# Patient Record
Sex: MALE | Race: WHITE | Employment: OTHER | URBAN - METROPOLITAN AREA
[De-identification: names, ages, dates, MRNs, and addresses within clinical notes are randomized per-mention and may not be internally consistent; named-entity substitution may affect disease eponyms.]

---

## 2017-03-08 ENCOUNTER — HOSPITAL ENCOUNTER (OUTPATIENT)
Dept: ENDOSCOPY | Age: 62
Discharge: HOME | End: 2017-03-08
Attending: INTERNAL MEDICINE | Admitting: INTERNAL MEDICINE

## 2017-03-08 VITALS
WEIGHT: 216.71 LBS | HEIGHT: 70 IN | OXYGEN SATURATION: 94 % | BODY MASS INDEX: 31.03 KG/M2 | RESPIRATION RATE: 16 BRPM | SYSTOLIC BLOOD PRESSURE: 112 MMHG | TEMPERATURE: 97.4 F | DIASTOLIC BLOOD PRESSURE: 76 MMHG | HEART RATE: 65 BPM

## 2017-03-08 RX ORDER — MIDAZOLAM HYDROCHLORIDE 1 MG/ML
INJECTION INTRAMUSCULAR; INTRAVENOUS
Status: COMPLETED | OUTPATIENT
Start: 2017-03-08 | End: 2017-03-08

## 2017-03-08 RX ORDER — SODIUM CHLORIDE 450 MG/100ML
INJECTION, SOLUTION INTRAVENOUS CONTINUOUS
Status: DISCONTINUED | OUTPATIENT
Start: 2017-03-08 | End: 2017-03-09 | Stop reason: HOSPADM

## 2017-03-08 RX ADMIN — MIDAZOLAM HYDROCHLORIDE 3 MG: 1 INJECTION INTRAMUSCULAR; INTRAVENOUS at 10:15

## 2017-03-08 RX ADMIN — MIDAZOLAM HYDROCHLORIDE 1 MG: 1 INJECTION INTRAMUSCULAR; INTRAVENOUS at 10:24

## 2017-03-08 RX ADMIN — MIDAZOLAM HYDROCHLORIDE 1 MG: 1 INJECTION INTRAMUSCULAR; INTRAVENOUS at 10:18

## 2017-03-08 RX ADMIN — MIDAZOLAM HYDROCHLORIDE 1 MG: 1 INJECTION INTRAMUSCULAR; INTRAVENOUS at 10:20

## 2017-03-08 ASSESSMENT — PAIN - FUNCTIONAL ASSESSMENT: PAIN_FUNCTIONAL_ASSESSMENT: 0-10

## 2017-03-08 ASSESSMENT — PAIN SCALES - GENERAL
PAINLEVEL_OUTOF10: 0
PAINLEVEL_OUTOF10: 0

## 2017-04-05 ENCOUNTER — OFFICE VISIT (OUTPATIENT)
Dept: CARDIOLOGY CLINIC | Age: 62
End: 2017-04-05

## 2017-04-05 VITALS
HEART RATE: 72 BPM | BODY MASS INDEX: 31.5 KG/M2 | WEIGHT: 220 LBS | DIASTOLIC BLOOD PRESSURE: 70 MMHG | HEIGHT: 70 IN | OXYGEN SATURATION: 96 % | SYSTOLIC BLOOD PRESSURE: 110 MMHG

## 2017-04-05 DIAGNOSIS — I10 ESSENTIAL HYPERTENSION, BENIGN: ICD-10-CM

## 2017-04-05 DIAGNOSIS — I25.10 ATHEROSCLEROSIS OF NATIVE CORONARY ARTERY OF NATIVE HEART WITHOUT ANGINA PECTORIS: Primary | ICD-10-CM

## 2017-04-05 PROCEDURE — G8598 ASA/ANTIPLAT THER USED: HCPCS | Performed by: INTERNAL MEDICINE

## 2017-04-05 PROCEDURE — G8417 CALC BMI ABV UP PARAM F/U: HCPCS | Performed by: INTERNAL MEDICINE

## 2017-04-05 PROCEDURE — 1036F TOBACCO NON-USER: CPT | Performed by: INTERNAL MEDICINE

## 2017-04-05 PROCEDURE — G8427 DOCREV CUR MEDS BY ELIG CLIN: HCPCS | Performed by: INTERNAL MEDICINE

## 2017-04-05 PROCEDURE — 99214 OFFICE O/P EST MOD 30 MIN: CPT | Performed by: INTERNAL MEDICINE

## 2017-04-05 PROCEDURE — 3017F COLORECTAL CA SCREEN DOC REV: CPT | Performed by: INTERNAL MEDICINE

## 2017-04-05 RX ORDER — FLUTICASONE PROPIONATE 50 MCG
1 SPRAY, SUSPENSION (ML) NASAL PRN
COMMUNITY

## 2017-04-05 RX ORDER — AMLODIPINE BESYLATE 10 MG/1
TABLET ORAL
Qty: 90 TABLET | Refills: 3 | Status: SHIPPED | OUTPATIENT
Start: 2017-04-05 | End: 2018-03-23 | Stop reason: SDUPTHER

## 2017-04-05 RX ORDER — EZETIMIBE 10 MG/1
TABLET ORAL
Qty: 90 TABLET | Refills: 3 | Status: SHIPPED | OUTPATIENT
Start: 2017-04-05 | End: 2018-03-23 | Stop reason: SDUPTHER

## 2017-04-05 RX ORDER — LISINOPRIL 10 MG/1
TABLET ORAL
Qty: 90 TABLET | Refills: 3 | Status: SHIPPED | OUTPATIENT
Start: 2017-04-05 | End: 2017-06-28

## 2017-06-28 RX ORDER — LISINOPRIL 10 MG/1
TABLET ORAL
Qty: 90 TABLET | Refills: 3 | Status: SHIPPED | OUTPATIENT
Start: 2017-06-28 | End: 2018-05-02 | Stop reason: SDUPTHER

## 2017-07-19 ENCOUNTER — TELEPHONE (OUTPATIENT)
Dept: CARDIOLOGY CLINIC | Age: 62
End: 2017-07-19

## 2018-03-02 ENCOUNTER — TELEPHONE (OUTPATIENT)
Dept: CARDIOLOGY CLINIC | Age: 63
End: 2018-03-02

## 2018-03-02 DIAGNOSIS — I25.811 ATHEROSCLEROSIS OF NATIVE CORONARY ARTERY OF TRANSPLANTED HEART, ANGINA PRESENCE UNSPECIFIED: ICD-10-CM

## 2018-03-02 DIAGNOSIS — I10 ESSENTIAL HYPERTENSION, BENIGN: Primary | ICD-10-CM

## 2018-03-02 NOTE — TELEPHONE ENCOUNTER
Called patient to schedule 1 yr ov. 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 Please advise.

## 2018-03-23 RX ORDER — AMLODIPINE BESYLATE 10 MG/1
TABLET ORAL
Qty: 30 TABLET | Refills: 0 | Status: SHIPPED | OUTPATIENT
Start: 2018-03-23 | End: 2018-04-18 | Stop reason: SDUPTHER

## 2018-03-23 RX ORDER — EZETIMIBE 10 MG/1
TABLET ORAL
Qty: 30 TABLET | Refills: 0 | Status: SHIPPED | OUTPATIENT
Start: 2018-03-23 | End: 2018-04-18 | Stop reason: SDUPTHER

## 2018-04-04 ENCOUNTER — HOSPITAL ENCOUNTER (OUTPATIENT)
Dept: NON INVASIVE DIAGNOSTICS | Age: 63
Discharge: HOME | End: 2018-04-04
Attending: INTERNAL MEDICINE | Admitting: INTERNAL MEDICINE

## 2018-04-04 DIAGNOSIS — I10 ESSENTIAL (PRIMARY) HYPERTENSION: ICD-10-CM

## 2018-04-06 ENCOUNTER — TELEPHONE (OUTPATIENT)
Dept: CARDIOLOGY CLINIC | Age: 63
End: 2018-04-06

## 2018-04-10 ENCOUNTER — OFFICE VISIT (OUTPATIENT)
Dept: CARDIOLOGY CLINIC | Age: 63
End: 2018-04-10

## 2018-04-10 VITALS
BODY MASS INDEX: 33.64 KG/M2 | HEART RATE: 84 BPM | SYSTOLIC BLOOD PRESSURE: 112 MMHG | WEIGHT: 235 LBS | HEIGHT: 70 IN | DIASTOLIC BLOOD PRESSURE: 80 MMHG

## 2018-04-10 DIAGNOSIS — I10 ESSENTIAL HYPERTENSION, BENIGN: ICD-10-CM

## 2018-04-10 DIAGNOSIS — I25.811 ATHEROSCLEROSIS OF NATIVE CORONARY ARTERY OF TRANSPLANTED HEART, ANGINA PRESENCE UNSPECIFIED: Primary | ICD-10-CM

## 2018-04-10 PROCEDURE — G8417 CALC BMI ABV UP PARAM F/U: HCPCS | Performed by: INTERNAL MEDICINE

## 2018-04-10 PROCEDURE — 1036F TOBACCO NON-USER: CPT | Performed by: INTERNAL MEDICINE

## 2018-04-10 PROCEDURE — G8598 ASA/ANTIPLAT THER USED: HCPCS | Performed by: INTERNAL MEDICINE

## 2018-04-10 PROCEDURE — G8427 DOCREV CUR MEDS BY ELIG CLIN: HCPCS | Performed by: INTERNAL MEDICINE

## 2018-04-10 PROCEDURE — 99214 OFFICE O/P EST MOD 30 MIN: CPT | Performed by: INTERNAL MEDICINE

## 2018-04-10 PROCEDURE — 3017F COLORECTAL CA SCREEN DOC REV: CPT | Performed by: INTERNAL MEDICINE

## 2018-04-18 RX ORDER — EZETIMIBE 10 MG/1
TABLET ORAL
Qty: 30 TABLET | Refills: 11 | Status: SHIPPED | OUTPATIENT
Start: 2018-04-18 | End: 2019-02-06 | Stop reason: SDUPTHER

## 2018-04-18 RX ORDER — AMLODIPINE BESYLATE 10 MG/1
TABLET ORAL
Qty: 30 TABLET | Refills: 11 | Status: SHIPPED | OUTPATIENT
Start: 2018-04-18 | End: 2019-02-06 | Stop reason: SDUPTHER

## 2018-05-02 RX ORDER — LISINOPRIL 10 MG/1
TABLET ORAL
Qty: 90 TABLET | Refills: 3 | Status: SHIPPED | OUTPATIENT
Start: 2018-05-02 | End: 2019-04-18 | Stop reason: SDUPTHER

## 2018-11-17 ENCOUNTER — HOSPITAL ENCOUNTER (EMERGENCY)
Age: 63
Discharge: HOME | End: 2018-11-17
Attending: EMERGENCY MEDICINE
Payer: COMMERCIAL

## 2018-11-17 ENCOUNTER — APPOINTMENT (OUTPATIENT)
Dept: GENERAL RADIOLOGY | Age: 63
End: 2018-11-17
Payer: COMMERCIAL

## 2018-11-17 VITALS
DIASTOLIC BLOOD PRESSURE: 74 MMHG | TEMPERATURE: 99.1 F | OXYGEN SATURATION: 95 % | RESPIRATION RATE: 19 BRPM | HEART RATE: 90 BPM | SYSTOLIC BLOOD PRESSURE: 118 MMHG

## 2018-11-17 DIAGNOSIS — R05.9 COUGH: ICD-10-CM

## 2018-11-17 DIAGNOSIS — R69 ILLNESS: Primary | ICD-10-CM

## 2018-11-17 DIAGNOSIS — I49.3 PVC (PREMATURE VENTRICULAR CONTRACTION): ICD-10-CM

## 2018-11-17 LAB
A/G RATIO: 1.1 (ref 1.1–2.2)
ALBUMIN SERPL-MCNC: 3.6 G/DL (ref 3.4–5)
ALP BLD-CCNC: 73 U/L (ref 40–129)
ALT SERPL-CCNC: 31 U/L (ref 10–40)
ANION GAP SERPL CALCULATED.3IONS-SCNC: 13 MMOL/L (ref 3–16)
AST SERPL-CCNC: 25 U/L (ref 15–37)
BASOPHILS ABSOLUTE: 0 K/UL (ref 0–0.2)
BASOPHILS RELATIVE PERCENT: 0.4 %
BILIRUB SERPL-MCNC: 1.2 MG/DL (ref 0–1)
BUN BLDV-MCNC: 15 MG/DL (ref 7–20)
CALCIUM SERPL-MCNC: 8.9 MG/DL (ref 8.3–10.6)
CHLORIDE BLD-SCNC: 98 MMOL/L (ref 99–110)
CO2: 26 MMOL/L (ref 21–32)
CREAT SERPL-MCNC: 1 MG/DL (ref 0.8–1.3)
EOSINOPHILS ABSOLUTE: 0 K/UL (ref 0–0.6)
EOSINOPHILS RELATIVE PERCENT: 0.4 %
GFR AFRICAN AMERICAN: >60
GFR NON-AFRICAN AMERICAN: >60
GLOBULIN: 3.4 G/DL
GLUCOSE BLD-MCNC: 101 MG/DL (ref 70–99)
HCT VFR BLD CALC: 42.2 % (ref 40.5–52.5)
HEMOGLOBIN: 14.3 G/DL (ref 13.5–17.5)
LYMPHOCYTES ABSOLUTE: 1.1 K/UL (ref 1–5.1)
LYMPHOCYTES RELATIVE PERCENT: 10 %
MCH RBC QN AUTO: 30.8 PG (ref 26–34)
MCHC RBC AUTO-ENTMCNC: 34 G/DL (ref 31–36)
MCV RBC AUTO: 90.7 FL (ref 80–100)
MONOCYTES ABSOLUTE: 1.3 K/UL (ref 0–1.3)
MONOCYTES RELATIVE PERCENT: 12.7 %
NEUTROPHILS ABSOLUTE: 8.1 K/UL (ref 1.7–7.7)
NEUTROPHILS RELATIVE PERCENT: 76.5 %
PDW BLD-RTO: 14.4 % (ref 12.4–15.4)
PLATELET # BLD: 180 K/UL (ref 135–450)
PMV BLD AUTO: 8.4 FL (ref 5–10.5)
POTASSIUM REFLEX MAGNESIUM: 3.9 MMOL/L (ref 3.5–5.1)
RBC # BLD: 4.65 M/UL (ref 4.2–5.9)
SODIUM BLD-SCNC: 137 MMOL/L (ref 136–145)
TOTAL PROTEIN: 7 G/DL (ref 6.4–8.2)
TROPONIN: <0.01 NG/ML
WBC # BLD: 10.5 K/UL (ref 4–11)

## 2018-11-17 PROCEDURE — 71046 X-RAY EXAM CHEST 2 VIEWS: CPT

## 2018-11-17 PROCEDURE — 99285 EMERGENCY DEPT VISIT HI MDM: CPT

## 2018-11-17 PROCEDURE — 84484 ASSAY OF TROPONIN QUANT: CPT

## 2018-11-17 PROCEDURE — 85025 COMPLETE CBC W/AUTO DIFF WBC: CPT

## 2018-11-17 PROCEDURE — 93005 ELECTROCARDIOGRAM TRACING: CPT | Performed by: EMERGENCY MEDICINE

## 2018-11-17 PROCEDURE — 80053 COMPREHEN METABOLIC PANEL: CPT

## 2018-11-17 RX ORDER — ALBUTEROL SULFATE 90 UG/1
AEROSOL, METERED RESPIRATORY (INHALATION)
Qty: 1 INHALER | Refills: 1 | Status: SHIPPED | OUTPATIENT
Start: 2018-11-17 | End: 2021-10-11

## 2018-11-17 ASSESSMENT — ENCOUNTER SYMPTOMS
NAUSEA: 0
RESPIRATORY NEGATIVE: 1
VOMITING: 0
EYES NEGATIVE: 1
ABDOMINAL DISTENTION: 0
SHORTNESS OF BREATH: 0
WHEEZING: 0
CHEST TIGHTNESS: 0
GASTROINTESTINAL NEGATIVE: 1

## 2018-11-17 NOTE — ED PROVIDER NOTES
[facility]  eMERGENCY dEPARTMENT eNCOUnter        Pt Name: Lisette Hooper  MRN: [id number]  [place] 1955  Date of evaluation: 11/17/2018  Provider: INOCENTE Drake CNP  PCP: Lisbet Causey    This patient was seen and evaluated by the attending physician No att. providers found. CHIEF COMPLAINT       Chief Complaint   Patient presents with    Other     pt sent by [facility], pt was being treated for flu at [facility], was told he had an irregular heart beat and was told to come to ED. pt states SOB worsening since the summer. HISTORY OF PRESENT ILLNESS   (Location/Symptom, Timing/Onset, Context/Setting, Quality, Duration, Modifying Factors, Severity)  Note limiting factors. Lisette Hooper is a 57 y.o. male presents the ED today after being told to come here from the [facility]. Patient states that he was being seen at the local clinic for flulike symptoms, diagnosed with the flu however during his workup there the provider at the clinic stated he had a regular heartbeat was told to come to the ED. Patient has no history of irregular heartbeat prior. Patient does have a history of an MI with a stent placement in 2009. Patient also has a cardiologist Dr. All Yan who he last visited in May and has routine stress tests every other year and has not recently had an issue. Patient denies any shortness of breath no chest pain noted palpitations no vomiting or dizziness no lightheadedness. Patient does state he has chills and some nausea with a temp at home of 102. Patient is afebrile here at 99.1. Patient has been taking Tylenol at home with ibuprofen for flulike symptoms. Patient came here to the ED today for further evaluation and treatment of the \"irregular heartbeat. \"     Nursing Notes were all reviewed and agreed with or any disagreements were addressed  in the HPI.     REVIEW OF SYSTEMS    (2-9 systems for level 4, and no mass. There is no tenderness. There is no rebound and no guarding. No hernia. Musculoskeletal: Normal range of motion. He exhibits no edema, tenderness or deformity. Lymphadenopathy:     He has no cervical adenopathy. Neurological: He is alert and oriented to person, place, and time. He is not disoriented. GCS eye subscore is 4. GCS verbal subscore is 5. GCS motor subscore is 6. Skin: Skin is warm, dry and intact. Capillary refill takes 2 to 3 seconds. No rash noted. He is not diaphoretic. No erythema. No pallor. Psychiatric: He has a normal mood and affect. His speech is normal and behavior is normal. Judgment and thought content normal. Cognition and memory are normal.   Nursing note and vitals reviewed. DIAGNOSTIC RESULTS   LABS:    Labs Reviewed   CBC WITH AUTO DIFFERENTIAL - Abnormal; Notable for the following:        Result Value    Neutrophils # 8.1 (*)     All other components within normal limits    Narrative:     Performed at:  [address] [address]   Phone [phone]   COMPREHENSIVE METABOLIC PANEL W/ REFLEX TO MG FOR LOW K - Abnormal; Notable for the following:     Chloride 98 (*)     Glucose 101 (*)     Total Bilirubin 1.2 (*)     All other components within normal limits    Narrative:     Performed at:  [address] [address]   Phone [phone]   TROPONIN    Narrative:     Performed at:  [address] [address]   Phone [phone]       All other labs were within normal range or not returned as of this dictation. EKG: All EKG's are interpreted by the Emergency Department Physician who either signs orCo-signs this chart in the absence of a cardiologist.  Please see their note for interpretation of EKG.       RADIOLOGY:   Non-plain film images such as CT, Ultrasound and MRI are [id number]  [phone]    Schedule an appointment as soon as possible for a visit   As needed, If symptoms worsen      DISCHARGE MEDICATIONS:  New Prescriptions    ALBUTEROL SULFATE HFA (PROAIR HFA) 108 (90 BASE) MCG/ACT INHALER    Use every 4 hours while awake for 7-10 days then PRN wheezing  Dispense with SPACER and Instruct on use. May sub Ventolin or Proventil as needed per [employer].        DISCONTINUED MEDICATIONS:  Discontinued Medications    No medications on file              (Please note that portions ofthis note were completed with a voice recognition program.  Efforts were made to edit the dictations but occasionally words are mis-transcribed.)    INOCENTE Talamantes CNP (electronically signed)           INOCENTE Talamantes CNP  11/17/18 2884

## 2018-11-18 LAB
EKG ATRIAL RATE: 89 BPM
EKG DIAGNOSIS: NORMAL
EKG P AXIS: 69 DEGREES
EKG P-R INTERVAL: 170 MS
EKG Q-T INTERVAL: 356 MS
EKG QRS DURATION: 110 MS
EKG QTC CALCULATION (BAZETT): 433 MS
EKG R AXIS: -38 DEGREES
EKG T AXIS: 18 DEGREES
EKG VENTRICULAR RATE: 89 BPM

## 2018-11-18 PROCEDURE — 93010 ELECTROCARDIOGRAM REPORT: CPT | Performed by: INTERNAL MEDICINE

## 2019-02-06 RX ORDER — AMLODIPINE BESYLATE 10 MG/1
TABLET ORAL
Qty: 30 TABLET | Refills: 3 | Status: SHIPPED | OUTPATIENT
Start: 2019-02-06 | End: 2019-04-18 | Stop reason: SDUPTHER

## 2019-02-06 RX ORDER — EZETIMIBE 10 MG/1
TABLET ORAL
Qty: 30 TABLET | Refills: 3 | Status: SHIPPED | OUTPATIENT
Start: 2019-02-06 | End: 2019-04-18 | Stop reason: SDUPTHER

## 2019-04-11 ENCOUNTER — TELEPHONE (OUTPATIENT)
Dept: CARDIOLOGY CLINIC | Age: 64
End: 2019-04-11

## 2019-04-11 NOTE — TELEPHONE ENCOUNTER
Spoke with Janie Rangel and advised him of message from Dr. Kirk Ba. He states he will start the metoprolol as recommended.

## 2019-04-11 NOTE — TELEPHONE ENCOUNTER
Pt and wife on phone together. Pt was discharged today from [facility]. They prescribed metoprolol for him and they are reluctant to add a med w/o [facility] being aware. Pt scheduled with [facility] on 4/18. Please advise pt on if he should  med from pharmacy or what Walker Murcia would like to do. Pt would also like to make sure [facility] can see records from [address] [place] [address] where he was recently admitted.

## 2019-04-11 NOTE — TELEPHONE ENCOUNTER
Care Everywhere updated. Patient had elevated troponins at [facility] but declined a work up. Was started on metoprolol tartrate 25 mg BID.

## 2019-04-18 ENCOUNTER — OFFICE VISIT (OUTPATIENT)
Dept: CARDIOLOGY CLINIC | Age: 64
End: 2019-04-18
Payer: MEDICARE

## 2019-04-18 VITALS
SYSTOLIC BLOOD PRESSURE: 100 MMHG | OXYGEN SATURATION: 98 % | WEIGHT: 208.6 LBS | BODY MASS INDEX: 29.86 KG/M2 | HEART RATE: 98 BPM | DIASTOLIC BLOOD PRESSURE: 60 MMHG | HEIGHT: 70 IN

## 2019-04-18 DIAGNOSIS — I25.811 ATHEROSCLEROSIS OF NATIVE CORONARY ARTERY OF TRANSPLANTED HEART, ANGINA PRESENCE UNSPECIFIED: Primary | ICD-10-CM

## 2019-04-18 DIAGNOSIS — E78.2 MIXED HYPERLIPIDEMIA: ICD-10-CM

## 2019-04-18 DIAGNOSIS — I10 ESSENTIAL HYPERTENSION, BENIGN: ICD-10-CM

## 2019-04-18 PROCEDURE — 99214 OFFICE O/P EST MOD 30 MIN: CPT | Performed by: INTERNAL MEDICINE

## 2019-04-18 RX ORDER — LISINOPRIL 10 MG/1
TABLET ORAL
Qty: 90 TABLET | Refills: 3 | Status: SHIPPED | OUTPATIENT
Start: 2019-04-18 | End: 2020-01-03 | Stop reason: SDUPTHER

## 2019-04-18 RX ORDER — METRONIDAZOLE 500 MG/1
500 TABLET ORAL 3 TIMES DAILY
COMMUNITY
End: 2021-10-11

## 2019-04-18 RX ORDER — EZETIMIBE 10 MG/1
TABLET ORAL
Qty: 90 TABLET | Refills: 3 | Status: SHIPPED | OUTPATIENT
Start: 2019-04-18 | End: 2020-01-03 | Stop reason: SDUPTHER

## 2019-04-18 RX ORDER — AMLODIPINE BESYLATE 10 MG/1
TABLET ORAL
Qty: 90 TABLET | Refills: 3 | Status: SHIPPED | OUTPATIENT
Start: 2019-04-18 | End: 2020-01-03 | Stop reason: SDUPTHER

## 2019-04-18 NOTE — PROGRESS NOTES
[facility]   Cardiac Followup    Referring Provider:  Ale Daugherty     Chief Complaint   Patient presents with    1 Year Follow Up    Other     HR going up and down        History of Present Illness:   Mr Aron He is here for a routine follow up for CAD, hyperlipidemia and hypertension. Today he states he went to the hospital with fevers and chills. He was found to have liver mass and large kidney stone. This all started last November. He has fatigue since he las left the hospital. He has a PICC line in place for antibiotics. He is tolerating his medications. Patient currently denies any weight gain, edema, palpitations, chest pain, shortness of breath, dizziness, and syncope. Not checking BP at home. Past Medical History:   has a past medical history of CAD (coronary artery disease), Hyperlipidemia, Hypertension, and Torn  meniscus. Surgical History:   has a past surgical history that includes Coronary angioplasty with stent; Colonoscopy; and Leg Surgery. Social History:   reports that he has quit smoking. He has never used smokeless tobacco. He reports that he drinks alcohol. He reports that he does not use drugs. Family History:  family history includes Heart Disease in his father and mother. Home Medications:  Prior to Admission medications    Medication Sig Start Date End Date Taking? Authorizing Provider   lisinopril (PRINIVIL;ZESTRIL) 10 MG tablet Take 10 mg by mouth daily. Yes Historical Provider, MD   clopidogrel (PLAVIX) 75 MG tablet Take 75 mg by mouth daily. Yes Historical Provider, MD   ezetimibe (ZETIA) 10 MG tablet Take 10 mg by mouth daily. Yes Historical Provider, MD   aspirin 325 MG tablet Take 325 mg by mouth daily. Yes Historical Provider, MD   fluticasone (FLONASE) 50 MCG/ACT nasal spray 1 spray by Nasal route daily.      Yes Historical Provider, MD   nitroGLYCERIN (NITROSTAT) 0.4 MG SL tablet Place 0.4 mg under the tongue every 5 minutes as needed. Yes Historical Provider, MD   fexofenadine (ALLEGRA) 180 MG tablet Take 180 mg by mouth daily. Yes Historical Provider, MD   tamsulosin (FLOMAX) 0.4 MG capsule Take 0.4 mg by mouth daily. Yes Historical Provider, MD   amlodipine (NORVASC) 5 MG tablet Take 1 tablet by mouth 2 times daily. 4/20/11 4/19/12 Yes Valerio Gill MD   fish oil-omega-3 fatty acids 1000 MG capsule Take 2 g by mouth daily. Historical Provider, MD   Ascorbic Acid (VITAMIN C) 500 MG tablet Take 500 mg by mouth daily. Historical Provider, MD   multivitamin (ANTIOXIDANT;PROSIGHT) TABS per tablet Take 1 tablet by mouth daily. Historical Provider, MD   etodolac (LODINE XL) 400 MG SR tablet Take 400 mg by mouth daily. Indications: 1-2 tabs as needed    Historical Provider, MD   atenolol (TENORMIN) 25 MG tablet Take 1 tablet by mouth daily. 4/22/11 4/21/12  Valerio Gill MD        Allergies:  Simvastatin and Statins [statins]     Review of Systems:   · Constitutional: there has been no unanticipated weight loss. There's been no change in energy level, sleep pattern, or activity level. · Eyes: No visual changes or diplopia. No scleral icterus. · ENT: No Headaches, hearing loss or vertigo. No mouth sores or sore throat. · Cardiovascular: Reviewed in HPI  · Respiratory: No cough or wheezing, no sputum production. No hematemesis. · Gastrointestinal: No abdominal pain, appetite loss, blood in stools. No change in bowel or bladder habits. · Genitourinary: No dysuria, trouble voiding, or hematuria. · Musculoskeletal:  No gait disturbance, weakness or joint complaints. · Integumentary: No rash or pruritis. · Neurological: No headache, diplopia, change in muscle strength, numbness or tingling. No change in gait, balance, coordination, mood, affect, memory, mentation, behavior. · Psychiatric: No anxiety, no depression. · Endocrine: No malaise, fatigue or temperature intolerance.  No excessive thirst, fluid intake, or urination. No tremor. · Hematologic/Lymphatic: No abnormal bruising or bleeding, blood clots or swollen lymph nodes. · Allergic/Immunologic: No nasal congestion or hives. Physical Examination:    Vitals:    04/18/19 1508   BP: 100/60   Pulse: 98   SpO2: 98%        Constitutional and General Appearance: NAD   Respiratory:  · Normal excursion and expansion without use of accessory muscles  · Resp Auscultation: Normal breath sounds without dullness  Cardiovascular:  · The apical impulses not displaced  · Heart tones are crisp and normal  · Cervical veins are not engorged  · The carotid upstroke is normal in amplitude and contour without delay or bruit  · Normal S1S2, No S3, No Murmur  · Peripheral pulses are symmetrical and full  · There is no clubbing, cyanosis of the extremities. · No edema  · Femoral Arteries: 2+ and equal  · Pedal Pulses: 2+ and equal   Abdomen:  · No masses or tenderness  · Liver/Spleen: No Abnormalities Noted  Neurological/Psychiatric:  · Alert and oriented in all spheres  · Moves all extremities well  · Exhibits normal gait balance and coordination  · No abnormalities of mood, affect, memory, mentation, or behavior are noted    Stress echo 4/16/2014  Normal  Plain GXT 4/2016  Normal    GXT 4/2018   Normal (Negative) response to exercise.  GXT Negative for ischemia   Frequent PVC's at rest. Frequency of PVC's decreased during exercise.   Rare couplet noted.   Exercise induced shortness of breath 4/10. Dyspnea resolved in recovery.   Patient denied chest pain. Assessment:     1. CAD (coronary artery disease) -- IWMI 2009 - thrombectomy, no stent. No angina. Stable. 2. HTN -- well controlled. Does not ck at home. 3. Hyperlipidemia --intolerant to statins, lipids have been acceptable. Managed by PCP. Stable. 4. Kidney   5.  Liver mass     Plan:  Continue current medications   Check blood pressure at home weekly  Regular exercise and following a healthy diet encouraged   Follow up with me in 1 year     Andrewibyojana's attestation: This note was scribed in the presence of Dr. Nico Sanchez M.D. By Kobe Abreu RN    The andrewibes docuementation has been prepared under my direction and personally reviewed by me in its entirety. I confirm that the note above accurately reflects all work, treatment, procedures, and medical decision making performed by me. MD Alex Joyner.  Carla Salcedo M.D., [facility]

## 2019-04-18 NOTE — PATIENT INSTRUCTIONS
Plan:  Continue current medications   Check blood pressure at home weekly  Regular exercise and following a healthy diet encouraged   Follow up with me in 1 year

## 2019-04-22 ENCOUNTER — TELEPHONE (OUTPATIENT)
Dept: CARDIOLOGY CLINIC | Age: 64
End: 2019-04-22

## 2019-04-22 NOTE — TELEPHONE ENCOUNTER
Usha Barfield at [facility] requesting letter of cardiac clearance for kidney stone removal with general anesthesia scheduled for tomorrow, 4/23. Please fax to [phone].  Katlyn Lowe

## 2019-05-30 ENCOUNTER — TELEPHONE (OUTPATIENT)
Dept: CARDIOLOGY CLINIC | Age: 64
End: 2019-05-30

## 2019-05-30 DIAGNOSIS — I25.811 ATHEROSCLEROSIS OF NATIVE CORONARY ARTERY OF TRANSPLANTED HEART, ANGINA PRESENCE UNSPECIFIED: ICD-10-CM

## 2019-05-30 DIAGNOSIS — Z02.89 ENCOUNTER FOR EXAMINATION REQUIRED BY DEPARTMENT OF TRANSPORTATION (DOT): Primary | ICD-10-CM

## 2019-06-14 ENCOUNTER — HOSPITAL ENCOUNTER (OUTPATIENT)
Dept: CARDIOLOGY | Age: 64
Discharge: HOME | End: 2019-06-14
Payer: MEDICARE

## 2019-06-14 VITALS — WEIGHT: 208 LBS | BODY MASS INDEX: 29.78 KG/M2 | HEIGHT: 70 IN

## 2019-06-14 DIAGNOSIS — Z02.89 ENCOUNTER FOR EXAMINATION REQUIRED BY DEPARTMENT OF TRANSPORTATION (DOT): ICD-10-CM

## 2019-06-14 DIAGNOSIS — I25.811 ATHEROSCLEROSIS OF NATIVE CORONARY ARTERY OF TRANSPLANTED HEART, ANGINA PRESENCE UNSPECIFIED: ICD-10-CM

## 2019-06-14 LAB
LV EF: 48 %
LVEF MODALITY: NORMAL

## 2019-06-14 PROCEDURE — 3430000000 HC RX DIAGNOSTIC RADIOPHARMACEUTICAL: Performed by: INTERNAL MEDICINE

## 2019-06-14 PROCEDURE — 93017 CV STRESS TEST TRACING ONLY: CPT

## 2019-06-14 PROCEDURE — A9502 TC99M TETROFOSMIN: HCPCS | Performed by: INTERNAL MEDICINE

## 2019-06-14 PROCEDURE — 78452 HT MUSCLE IMAGE SPECT MULT: CPT

## 2019-06-14 RX ADMIN — TETROFOSMIN 33.8 MILLICURIE: 1.38 INJECTION, POWDER, LYOPHILIZED, FOR SOLUTION INTRAVENOUS at 09:45

## 2019-06-14 RX ADMIN — TETROFOSMIN 11.6 MILLICURIE: 1.38 INJECTION, POWDER, LYOPHILIZED, FOR SOLUTION INTRAVENOUS at 08:15

## 2019-06-17 ENCOUNTER — TELEPHONE (OUTPATIENT)
Dept: CARDIOLOGY CLINIC | Age: 64
End: 2019-06-17

## 2019-06-17 NOTE — TELEPHONE ENCOUNTER
----- Message from Abbie Quinonez MD sent at 6/14/2019 12:55 PM EDT -----  Call  Stress test ok  Shows old MI  No new blockage  Low risk

## 2019-06-17 NOTE — TELEPHONE ENCOUNTER
Created telephone encounter. Spoke with Cristiane Pinedo relayed message per [address] regarding Stress test. Pt verbalized understanding. Pt would like report faxed to [facility] [phone], faxed as requested.

## 2019-06-18 ENCOUNTER — TELEPHONE (OUTPATIENT)
Dept: CARDIOLOGY CLINIC | Age: 64
End: 2019-06-18

## 2019-12-10 ENCOUNTER — OFFICE VISIT (OUTPATIENT)
Dept: ORTHOPEDIC SURGERY | Age: 64
End: 2019-12-10
Payer: MEDICARE

## 2019-12-10 VITALS
HEART RATE: 50 BPM | BODY MASS INDEX: 32.93 KG/M2 | HEIGHT: 70 IN | SYSTOLIC BLOOD PRESSURE: 111 MMHG | WEIGHT: 230 LBS | DIASTOLIC BLOOD PRESSURE: 72 MMHG

## 2019-12-10 DIAGNOSIS — M17.0 PRIMARY OSTEOARTHRITIS OF BOTH KNEES: Primary | ICD-10-CM

## 2019-12-10 PROCEDURE — 99204 OFFICE O/P NEW MOD 45 MIN: CPT | Performed by: PHYSICIAN ASSISTANT

## 2019-12-11 ENCOUNTER — TELEPHONE (OUTPATIENT)
Dept: CARDIOLOGY CLINIC | Age: 64
End: 2019-12-11

## 2020-01-03 RX ORDER — EZETIMIBE 10 MG/1
TABLET ORAL
Qty: 90 TABLET | Refills: 3 | Status: SHIPPED | OUTPATIENT
Start: 2020-01-03 | End: 2020-10-16

## 2020-01-03 RX ORDER — LISINOPRIL 10 MG/1
TABLET ORAL
Qty: 90 TABLET | Refills: 3 | Status: SHIPPED | OUTPATIENT
Start: 2020-01-03 | End: 2020-10-16

## 2020-01-03 RX ORDER — AMLODIPINE BESYLATE 10 MG/1
TABLET ORAL
Qty: 90 TABLET | Refills: 3 | Status: SHIPPED | OUTPATIENT
Start: 2020-01-03 | End: 2020-10-16

## 2020-10-19 RX ORDER — LISINOPRIL 10 MG/1
TABLET ORAL
Qty: 90 TABLET | Refills: 0 | Status: SHIPPED | OUTPATIENT
Start: 2020-10-19 | End: 2020-10-21 | Stop reason: SDUPTHER

## 2020-10-19 RX ORDER — AMLODIPINE BESYLATE 10 MG/1
TABLET ORAL
Qty: 90 TABLET | Refills: 0 | Status: SHIPPED | OUTPATIENT
Start: 2020-10-19 | End: 2020-10-21 | Stop reason: SDUPTHER

## 2020-10-19 RX ORDER — EZETIMIBE 10 MG/1
TABLET ORAL
Qty: 90 TABLET | Refills: 0 | Status: SHIPPED | OUTPATIENT
Start: 2020-10-19 | End: 2020-10-21 | Stop reason: SDUPTHER

## 2020-10-21 ENCOUNTER — HOSPITAL ENCOUNTER (OUTPATIENT)
Age: 65
Discharge: HOME | End: 2020-10-21
Payer: MEDICARE

## 2020-10-21 ENCOUNTER — OFFICE VISIT (OUTPATIENT)
Dept: CARDIOLOGY CLINIC | Age: 65
End: 2020-10-21
Payer: MEDICARE

## 2020-10-21 VITALS
WEIGHT: 229.5 LBS | OXYGEN SATURATION: 95 % | HEIGHT: 70 IN | BODY MASS INDEX: 32.86 KG/M2 | DIASTOLIC BLOOD PRESSURE: 80 MMHG | SYSTOLIC BLOOD PRESSURE: 100 MMHG | HEART RATE: 70 BPM

## 2020-10-21 PROCEDURE — 80061 LIPID PANEL: CPT

## 2020-10-21 PROCEDURE — 99214 OFFICE O/P EST MOD 30 MIN: CPT | Performed by: INTERNAL MEDICINE

## 2020-10-21 PROCEDURE — 36415 COLL VENOUS BLD VENIPUNCTURE: CPT

## 2020-10-21 RX ORDER — NITROGLYCERIN 0.4 MG/1
0.4 TABLET SUBLINGUAL EVERY 5 MIN PRN
Qty: 25 TABLET | Refills: 0 | Status: SHIPPED | OUTPATIENT
Start: 2020-10-21 | End: 2021-10-11 | Stop reason: SDUPTHER

## 2020-10-21 RX ORDER — LISINOPRIL 10 MG/1
TABLET ORAL
Qty: 90 TABLET | Refills: 3 | Status: SHIPPED | OUTPATIENT
Start: 2020-10-21 | End: 2021-07-20

## 2020-10-21 RX ORDER — EZETIMIBE 10 MG/1
TABLET ORAL
Qty: 90 TABLET | Refills: 3 | Status: SHIPPED | OUTPATIENT
Start: 2020-10-21 | End: 2021-07-20

## 2020-10-21 RX ORDER — AMLODIPINE BESYLATE 10 MG/1
TABLET ORAL
Qty: 90 TABLET | Refills: 3 | Status: SHIPPED | OUTPATIENT
Start: 2020-10-21 | End: 2021-07-20

## 2020-10-21 NOTE — PROGRESS NOTES
mid inferolateral  wall. There is a large defect in the inferolateral and inferior wall at  stress that does not improve with rest consistent with myocardial scar. No  gross ischemia. Assessment:     1. CAD (coronary artery disease) -- IWMI 2009 - thrombectomy, no stent. No angina. Stable. 2. HTN -- low today. No symptoms. Cks at home, well controlled. 3. Hyperlipidemia --intolerant to statins, lipids have been acceptable. Results reviewed. LDL < 100. Doni Confer No Repatha at this time. Stable. 4. History of kidney stones   5. Liver mass     Plan:  Fasting lipids   Continue current medications   Check blood pressure at home weekly  Regular exercise and following a healthy diet encouraged   Follow up with me in 1 year     Scribe's attestation: This note was scribed in the presence of Dr. Sybil Chow M.D. By Gm Cooley RN    The scribes documentation has been prepared under my direction and personally reviewed by me in its entirety. I confirm that the note above accurately reflects all work, treatment, procedures, and medical decision making performed by me. MD Gopi Mcguire.  Mattie Nye M.D., [facility]

## 2020-10-21 NOTE — PATIENT INSTRUCTIONS
Plan:  Fasting lipids   Continue current medications   Check blood pressure at home weekly  Regular exercise and following a healthy diet encouraged   Follow up with me in 1 year

## 2020-10-21 NOTE — LETTER
[facility]   Cardiac Followup    Referring Provider:  Yaya Majano     Chief Complaint   Patient presents with    1 Year Follow Up     No new cardiac symptoms or concerns to report at this time.  Coronary Artery Disease    Hypertension    Hyperlipidemia        History of Present Illness:    Bishop Bishop is a 75 y.o. male who is here today for follow up for a history of coronary artery disease- IWMI in 2009 with thrombectomy but no stent placement, hypertension, hyperlipidemia. He had a stress test in 6/2019 which was abnormal but did not show any gross ischemia or new blockages. Today he states he has been feeling well. His blood pressure at home is well controlled. He is tolerating his medications. He states he had been going to the gym prior to [facility]. He is normally active all summer. Patient currently denies any weight gain, edema, palpitations, chest pain, shortness of breath, dizziness, and syncope. Past Medical History:   has a past medical history of CAD (coronary artery disease), Hyperlipidemia, Hypertension, and Torn  meniscus. Surgical History:   has a past surgical history that includes Coronary angioplasty with stent; Colonoscopy; and Leg Surgery. Social History:   reports that he has quit smoking. He has never used smokeless tobacco. He reports current alcohol use. He reports that he does not use drugs. Family History:  family history includes Heart Disease in his father and mother. Home Medications:  Prior to Admission medications    Medication Sig Start Date End Date Taking? Authorizing Provider   lisinopril (PRINIVIL;ZESTRIL) 10 MG tablet Take 10 mg by mouth daily. Yes Historical Provider, MD   clopidogrel (PLAVIX) 75 MG tablet Take 75 mg by mouth daily. Yes Historical Provider, MD   ezetimibe (ZETIA) 10 MG tablet Take 10 mg by mouth daily. Yes Historical Provider, MD   aspirin 325 MG tablet Take 325 mg by mouth daily.      Yes Historical

## 2020-10-22 ENCOUNTER — TELEPHONE (OUTPATIENT)
Dept: CARDIOLOGY CLINIC | Age: 65
End: 2020-10-22

## 2020-10-22 LAB
CHOLESTEROL, TOTAL: 177 MG/DL (ref 0–199)
HDLC SERPL-MCNC: 51 MG/DL (ref 40–60)
LDL CHOLESTEROL CALCULATED: 108 MG/DL
TRIGL SERPL-MCNC: 88 MG/DL (ref 0–150)
VLDLC SERPL CALC-MCNC: 18 MG/DL

## 2020-10-22 NOTE — TELEPHONE ENCOUNTER
----- Message from Schuyler Mccann MD sent at 10/22/2020  8:51 AM EDT -----  Call  Chol remains elev despite zetia  Intolerant to statin  Discuss and start PA for Repfamilia

## 2020-10-22 NOTE — TELEPHONE ENCOUNTER
I called and spoke with patient reviewing results. Discussed Repatha. Patient would like to think about it and will call us back.

## 2020-10-30 ENCOUNTER — TELEPHONE (OUTPATIENT)
Dept: CARDIOLOGY CLINIC | Age: 65
End: 2020-10-30

## 2020-10-30 NOTE — TELEPHONE ENCOUNTER
Pt calling wanting to speak with [facility]. Regarding Repatha. Please call.

## 2021-07-20 RX ORDER — AMLODIPINE BESYLATE 10 MG/1
TABLET ORAL
Qty: 90 TABLET | Refills: 1 | Status: SHIPPED | OUTPATIENT
Start: 2021-07-20 | End: 2022-05-23

## 2021-07-20 RX ORDER — EZETIMIBE 10 MG/1
TABLET ORAL
Qty: 90 TABLET | Refills: 1 | Status: SHIPPED | OUTPATIENT
Start: 2021-07-20 | End: 2021-10-11 | Stop reason: ALTCHOICE

## 2021-07-20 RX ORDER — LISINOPRIL 10 MG/1
TABLET ORAL
Qty: 90 TABLET | Refills: 1 | Status: SHIPPED | OUTPATIENT
Start: 2021-07-20 | End: 2022-05-23

## 2021-10-06 ENCOUNTER — TELEPHONE (OUTPATIENT)
Dept: CARDIOLOGY CLINIC | Age: 66
End: 2021-10-06

## 2021-10-07 NOTE — TELEPHONE ENCOUNTER
[facility] called to check on refill request for Repatha. I advised [facility] already requested a refill and a PA. [facility] advised they have been taking care of pt's Reptha for months and they are the pt's preferred pharmacy. Please send refill to [facility] @ [phone].

## 2021-10-11 ENCOUNTER — OFFICE VISIT (OUTPATIENT)
Dept: CARDIOLOGY CLINIC | Age: 66
End: 2021-10-11
Payer: MEDICARE

## 2021-10-11 VITALS
HEART RATE: 81 BPM | DIASTOLIC BLOOD PRESSURE: 76 MMHG | WEIGHT: 231 LBS | HEIGHT: 70 IN | BODY MASS INDEX: 33.07 KG/M2 | OXYGEN SATURATION: 96 % | SYSTOLIC BLOOD PRESSURE: 130 MMHG

## 2021-10-11 DIAGNOSIS — I25.10 CORONARY ARTERY DISEASE INVOLVING NATIVE CORONARY ARTERY OF NATIVE HEART WITHOUT ANGINA PECTORIS: Primary | ICD-10-CM

## 2021-10-11 PROCEDURE — G8417 CALC BMI ABV UP PARAM F/U: HCPCS | Performed by: INTERNAL MEDICINE

## 2021-10-11 PROCEDURE — 3017F COLORECTAL CA SCREEN DOC REV: CPT | Performed by: INTERNAL MEDICINE

## 2021-10-11 PROCEDURE — 4040F PNEUMOC VAC/ADMIN/RCVD: CPT | Performed by: INTERNAL MEDICINE

## 2021-10-11 PROCEDURE — 1036F TOBACCO NON-USER: CPT | Performed by: INTERNAL MEDICINE

## 2021-10-11 PROCEDURE — G8427 DOCREV CUR MEDS BY ELIG CLIN: HCPCS | Performed by: INTERNAL MEDICINE

## 2021-10-11 PROCEDURE — 99214 OFFICE O/P EST MOD 30 MIN: CPT | Performed by: INTERNAL MEDICINE

## 2021-10-11 PROCEDURE — 1123F ACP DISCUSS/DSCN MKR DOCD: CPT | Performed by: INTERNAL MEDICINE

## 2021-10-11 PROCEDURE — G8484 FLU IMMUNIZE NO ADMIN: HCPCS | Performed by: INTERNAL MEDICINE

## 2021-10-11 RX ORDER — EVOLOCUMAB 140 MG/ML
INJECTION, SOLUTION SUBCUTANEOUS
Qty: 2 ML | Refills: 11 | Status: SHIPPED | OUTPATIENT
Start: 2021-10-11 | End: 2022-08-11

## 2021-10-11 RX ORDER — NITROGLYCERIN 0.4 MG/1
0.4 TABLET SUBLINGUAL EVERY 5 MIN PRN
Qty: 25 TABLET | Refills: 1 | Status: SHIPPED | OUTPATIENT
Start: 2021-10-11 | End: 2021-11-03

## 2021-10-11 NOTE — PROGRESS NOTES
[facility] [facility]   Cardiac Followup    Referring Provider:  Armando Miller     Chief Complaint   Patient presents with    1 Year Follow Up    Hypertension    Hyperlipidemia      History of Present Illness:    Valdo Martell is a 77 y.o. male who is here today for follow up for a history of coronary artery disease- IWMI in 2009 with thrombectomy but no stent placement, hypertension, hyperlipidemia. He had a stress test in 6/2019 which was abnormal but did not show any gross ischemia or new blockages. Today, he states that he feels well. He has no complaints. He denies exertional chest pain, MUHAMMAD/PND, palpitations, light-headedness, edema. He remains on Repatha without side effects. Remains active. BP normal at home. .    Past Medical History:   has a past medical history of CAD (coronary artery disease), Hyperlipidemia, Hypertension, and Torn  meniscus. Surgical History:   has a past surgical history that includes Coronary angioplasty with stent; Colonoscopy; and Leg Surgery. Social History:   reports that he has quit smoking. He has never used smokeless tobacco. He reports current alcohol use. He reports that he does not use drugs. Family History:  family history includes Heart Disease in his father and mother. Home Medications:  Prior to Admission medications    Medication Sig Start Date End Date Taking? Authorizing Provider   lisinopril (PRINIVIL;ZESTRIL) 10 MG tablet Take 10 mg by mouth daily. Yes Historical Provider, MD   clopidogrel (PLAVIX) 75 MG tablet Take 75 mg by mouth daily. Yes Historical Provider, MD   ezetimibe (ZETIA) 10 MG tablet Take 10 mg by mouth daily. Yes Historical Provider, MD   aspirin 325 MG tablet Take 325 mg by mouth daily. Yes Historical Provider, MD   fluticasone (FLONASE) 50 MCG/ACT nasal spray 1 spray by Nasal route daily.      Yes Historical Provider, MD   nitroGLYCERIN (NITROSTAT) 0.4 MG SL tablet Place 0.4 mg under the tongue every 5 minutes as needed. Yes Historical Provider, MD   fexofenadine (ALLEGRA) 180 MG tablet Take 180 mg by mouth daily. Yes Historical Provider, MD   tamsulosin (FLOMAX) 0.4 MG capsule Take 0.4 mg by mouth daily. Yes Historical Provider, MD   amlodipine (NORVASC) 5 MG tablet Take 1 tablet by mouth 2 times daily. 4/20/11 4/19/12 Yes Marsha Lima MD   fish oil-omega-3 fatty acids 1000 MG capsule Take 2 g by mouth daily. Historical Provider, MD   Ascorbic Acid (VITAMIN C) 500 MG tablet Take 500 mg by mouth daily. Historical Provider, MD   multivitamin (ANTIOXIDANT;PROSIGHT) TABS per tablet Take 1 tablet by mouth daily. Historical Provider, MD   etodolac (LODINE XL) 400 MG SR tablet Take 400 mg by mouth daily. Indications: 1-2 tabs as needed    Historical Provider, MD   atenolol (TENORMIN) 25 MG tablet Take 1 tablet by mouth daily. 4/22/11 4/21/12  Marsha Lima MD        Allergies:  Simvastatin and Statins [statins]     Review of Systems:   · Constitutional: there has been no unanticipated weight loss. There's been no change in energy level, sleep pattern, or activity level. · Eyes: No visual changes or diplopia. No scleral icterus. · ENT: No Headaches, hearing loss or vertigo. No mouth sores or sore throat. · Cardiovascular: Reviewed in HPI  · Respiratory: No cough or wheezing, no sputum production. No hematemesis. · Gastrointestinal: No abdominal pain, appetite loss, blood in stools. No change in bowel or bladder habits. · Genitourinary: No dysuria, trouble voiding, or hematuria. · Musculoskeletal:  No gait disturbance, weakness or joint complaints. · Integumentary: No rash or pruritis. · Neurological: No headache, diplopia, change in muscle strength, numbness or tingling. No change in gait, balance, coordination, mood, affect, memory, mentation, behavior. · Psychiatric: No anxiety, no depression. · Endocrine: No malaise, fatigue or temperature intolerance.  No excessive thirst, fluid intake, or urination. No tremor. · Hematologic/Lymphatic: No abnormal bruising or bleeding, blood clots or swollen lymph nodes. · Allergic/Immunologic: No nasal congestion or hives. Physical Examination:    Vitals:    10/11/21 1506   BP: 130/76   Pulse: 81   SpO2: 96%        Constitutional and General Appearance: NAD   Respiratory:  · Normal excursion and expansion without use of accessory muscles  · Resp Auscultation: Normal breath sounds without dullness  Cardiovascular:  · The apical impulses not displaced  · Heart tones are crisp and normal  · Cervical veins are not engorged  · The carotid upstroke is normal in amplitude and contour without delay or bruit  · Normal S1S2, No S3, No Murmur  · Peripheral pulses are symmetrical and full  · There is no clubbing, cyanosis of the extremities. · No edema  · Femoral Arteries: 2+ and equal  · Pedal Pulses: 2+ and equal   Abdomen:  · No masses or tenderness  · Liver/Spleen: No Abnormalities Noted  Neurological/Psychiatric:  · Alert and oriented in all spheres  · Moves all extremities well  · Exhibits normal gait balance and coordination  · No abnormalities of mood, affect, memory, mentation, or behavior are noted    Stress echo 4/16/2014  Normal  Plain GXT 4/2016  Normal    GXT 4/2018   Normal (Negative) response to exercise.  GXT Negative for ischemia   Frequent PVC's at rest. Frequency of PVC's decreased during exercise.   Rare couplet noted.   Exercise induced shortness of breath 4/10. Dyspnea resolved in recovery.   Patient denied chest pain. Stress test 6/14/19  Summary  ABNORMAL MODERATE RISK STRESS TEST>  Normal LV size and mildly reduced systolic function. Left ventricular  ejection fraction of 48%. Hypokinesis of the basal and mid inferolateral  wall. There is a large defect in the inferolateral and inferior wall at  stress that does not improve with rest consistent with myocardial scar. No  gross ischemia. Assessment:   1. CAD (coronary artery disease) -- IWMI 2009 - thrombectomy, no stent. No angina. Stable. 2. HTN -- Stable, Cks at home, well controlled. 3. Hyperlipidemia -- Intolerant to statins, lipids excellent on Repatha. OK to stop Zetia. 4. History of kidney stones   5. Liver mass     Plan:  Stop Zetia  Will send PA for Repatha. [facility] Cardiac medications reviewed including indications and pertinent side effects    Check blood pressure and heart rate at home a few times per week- keep a log with dates and times and bring to office visit   Regular exercise and following a healthy diet encouraged   Follow up with me in 1 year    Zamzam Weber. Marian Alvarez M.D., [facility], [address] attestation: This note was scribed in the presence of Dr. Marian Alvarez by Hailey Lai RN. The scribes documentation has been prepared under my direction and personally reviewed by me in its entirety. I confirm that the note above accurately reflects all work, treatment, procedures, and medical decision making performed by me.     Dr. Saravanan Tracy MD

## 2021-11-03 RX ORDER — NITROGLYCERIN 0.4 MG/1
TABLET SUBLINGUAL
Qty: 25 TABLET | Refills: 1 | Status: SHIPPED | OUTPATIENT
Start: 2021-11-03

## 2022-01-20 ENCOUNTER — TELEPHONE (OUTPATIENT)
Dept: CARDIOLOGY CLINIC | Age: 67
End: 2022-01-20

## 2022-01-20 NOTE — TELEPHONE ENCOUNTER
CARDIAC CLEARANCE REQUEST    What type of procedure are you having:  Right knee replacement  Are you taking any blood thinners:  aspirin 81 MG tablet [[id number]]      Type on anesthesia:  Not sure  When is your procedure scheduled for:  2/15/22  What physician is performing your procedure:  Dr. Franklyn Keith  Phone Number:    Fax number to send the letter:  [phone]    Pt had an EKG on 01/19/2022.

## 2022-01-20 NOTE — LETTER
[address] Cardiology - [address] [place] [address] [facility]  Phone: [phone]  Fax: [phone]    Blu Chaudhari MD      Cardiac Clearance Letter  January 21, 2022    Shamar [phone] [phone]   1955        Abelino Moore may proceed with upcoming knee replacement. He is cleared from a cardiac standpoint. He is to remain on ASA, do not stop ASA. If you have any questions or concerns, please don't hesitate to call.     Sincerely,        Blu Chaudhari MD

## 2022-05-23 RX ORDER — LISINOPRIL 10 MG/1
TABLET ORAL
Qty: 90 TABLET | Refills: 2 | Status: SHIPPED | OUTPATIENT
Start: 2022-05-23

## 2022-05-23 RX ORDER — AMLODIPINE BESYLATE 10 MG/1
TABLET ORAL
Qty: 90 TABLET | Refills: 2 | Status: SHIPPED | OUTPATIENT
Start: 2022-05-23

## 2022-08-09 RX ORDER — EVOLOCUMAB 140 MG/ML
INJECTION, SOLUTION SUBCUTANEOUS
Qty: 6 ML | Status: CANCELLED | OUTPATIENT
Start: 2022-08-09

## 2022-08-11 RX ORDER — EVOLOCUMAB 140 MG/ML
INJECTION, SOLUTION SUBCUTANEOUS
Qty: 6 ML | Refills: 3 | Status: SHIPPED | OUTPATIENT
Start: 2022-08-11

## 2022-09-27 ENCOUNTER — TELEPHONE (OUTPATIENT)
Dept: CARDIOLOGY CLINIC | Age: 67
End: 2022-09-27

## 2022-09-27 NOTE — TELEPHONE ENCOUNTER
CARDIAC CLEARANCE REQUEST    What type of procedure are you having: left knee replacement     Are you taking any blood thinners: ASA      Type on anesthesia: unsure      When is your procedure scheduled for: 11/01/2022    What physician is performing your procedure: Dr. Jose Mann     Phone Number: [phone]    Fax number to send the letter:

## 2022-09-27 NOTE — TELEPHONE ENCOUNTER
LMOM for pt to contact the office to schedule an appt. I went ahead and scheduled for [facility] on 9/28. Please confirm that this works for the pt when he calls back.

## 2022-10-21 ENCOUNTER — OFFICE VISIT (OUTPATIENT)
Dept: CARDIOLOGY CLINIC | Age: 67
End: 2022-10-21
Payer: MEDICARE

## 2022-10-21 VITALS
WEIGHT: 217 LBS | BODY MASS INDEX: 31.07 KG/M2 | HEIGHT: 70 IN | HEART RATE: 69 BPM | DIASTOLIC BLOOD PRESSURE: 84 MMHG | SYSTOLIC BLOOD PRESSURE: 138 MMHG | OXYGEN SATURATION: 96 %

## 2022-10-21 DIAGNOSIS — Z01.810 PREOPERATIVE CARDIOVASCULAR EXAMINATION: Primary | ICD-10-CM

## 2022-10-21 PROCEDURE — G8417 CALC BMI ABV UP PARAM F/U: HCPCS | Performed by: INTERNAL MEDICINE

## 2022-10-21 PROCEDURE — 1036F TOBACCO NON-USER: CPT | Performed by: INTERNAL MEDICINE

## 2022-10-21 PROCEDURE — G8484 FLU IMMUNIZE NO ADMIN: HCPCS | Performed by: INTERNAL MEDICINE

## 2022-10-21 PROCEDURE — 3017F COLORECTAL CA SCREEN DOC REV: CPT | Performed by: INTERNAL MEDICINE

## 2022-10-21 PROCEDURE — 1123F ACP DISCUSS/DSCN MKR DOCD: CPT | Performed by: INTERNAL MEDICINE

## 2022-10-21 PROCEDURE — 99214 OFFICE O/P EST MOD 30 MIN: CPT | Performed by: INTERNAL MEDICINE

## 2022-10-21 PROCEDURE — G8427 DOCREV CUR MEDS BY ELIG CLIN: HCPCS | Performed by: INTERNAL MEDICINE

## 2022-10-21 PROCEDURE — 93000 ELECTROCARDIOGRAM COMPLETE: CPT | Performed by: INTERNAL MEDICINE

## 2022-10-21 NOTE — PATIENT INSTRUCTIONS
Plan:  [id number] Tyesha Mcintosh for surgery- remain on aspirin   Cardiac medications reviewed including indications and pertinent side effects. Medication list updated at this visit.    Check blood pressure and heart rate at home a few times per week- keep a log with dates and times and bring to office visit   Regular exercise and following a healthy diet encouraged   Follow up with me in 1 year

## 2022-10-21 NOTE — PROGRESS NOTES
[address] [facility]   Cardiac Followup    Referring Provider:  Clemente Maldonado     Chief Complaint   Patient presents with    Cardiac Clearance     Left knee replacement 11/1/2022        Kevin Gandara Cull   1955    History of Present Illness:    Shantelle Salgado is a 74 y.o. male who is here today for follow up for a history of coronary artery disease- IWMI in 2009 with thrombectomy but no stent placement, hypertension, hyperlipidemia. He had a stress test in 6/2019 which was abnormal but did not show any gross ischemia or new blockages. Today he states he has been feeling well since his last visit. He is tolerating his medications and is taking them as prescribed. Doing well on Repatha. He states his blood pressure at home is running 125-130/70's. He plans to increase his walking after he recovers form his knee replacement. Patient currently denies any weight gain, edema, palpitations, chest pain, shortness of breath, dizziness, and syncope. Past Medical History:   has a past medical history of CAD (coronary artery disease), Hyperlipidemia, Hypertension, and Torn  meniscus. Surgical History:   has a past surgical history that includes Coronary angioplasty with stent; Colonoscopy; and Leg Surgery. Social History:   reports that he has quit smoking. He has never used smokeless tobacco. He reports current alcohol use. He reports that he does not use drugs. Family History:  family history includes Heart Disease in his father and mother. Home Medications:  Prior to Admission medications    Medication Sig Start Date End Date Taking? Authorizing Provider   lisinopril (PRINIVIL;ZESTRIL) 10 MG tablet Take 10 mg by mouth daily. Yes Historical Provider, MD   clopidogrel (PLAVIX) 75 MG tablet Take 75 mg by mouth daily. Yes Historical Provider, MD   ezetimibe (ZETIA) 10 MG tablet Take 10 mg by mouth daily. Yes Historical Provider, MD   aspirin 325 MG tablet Take 325 mg by mouth daily. Yes Historical Provider, MD   fluticasone (FLONASE) 50 MCG/ACT nasal spray 1 spray by Nasal route daily. Yes Historical Provider, MD   nitroGLYCERIN (NITROSTAT) 0.4 MG SL tablet Place 0.4 mg under the tongue every 5 minutes as needed. Yes Historical Provider, MD   fexofenadine (ALLEGRA) 180 MG tablet Take 180 mg by mouth daily. Yes Historical Provider, MD   tamsulosin (FLOMAX) 0.4 MG capsule Take 0.4 mg by mouth daily. Yes Historical Provider, MD   amlodipine (NORVASC) 5 MG tablet Take 1 tablet by mouth 2 times daily. 4/20/11 4/19/12 Yes Rin Garcia MD   fish oil-omega-3 fatty acids 1000 MG capsule Take 2 g by mouth daily. Historical Provider, MD   Ascorbic Acid (VITAMIN C) 500 MG tablet Take 500 mg by mouth daily. Historical Provider, MD   multivitamin (ANTIOXIDANT;PROSIGHT) TABS per tablet Take 1 tablet by mouth daily. Historical Provider, MD   etodolac (LODINE XL) 400 MG SR tablet Take 400 mg by mouth daily. Indications: 1-2 tabs as needed    Historical Provider, MD   atenolol (TENORMIN) 25 MG tablet Take 1 tablet by mouth daily. 4/22/11 4/21/12  Rin Garcia MD        Allergies:  Simvastatin and Statins [statins]     Review of Systems:   Constitutional: there has been no unanticipated weight loss. There's been no change in energy level, sleep pattern, or activity level. Eyes: No visual changes or diplopia. No scleral icterus. ENT: No Headaches, hearing loss or vertigo. No mouth sores or sore throat. Cardiovascular: Reviewed in HPI  Respiratory: No cough or wheezing, no sputum production. No hematemesis. Gastrointestinal: No abdominal pain, appetite loss, blood in stools. No change in bowel or bladder habits. Genitourinary: No dysuria, trouble voiding, or hematuria. Musculoskeletal:  No gait disturbance, weakness or joint complaints. Integumentary: No rash or pruritis.   Neurological: No headache, diplopia, change in muscle strength, numbness or tingling. No change in gait, balance, coordination, mood, affect, memory, mentation, behavior. Psychiatric: No anxiety, no depression. Endocrine: No malaise, fatigue or temperature intolerance. No excessive thirst, fluid intake, or urination. No tremor. Hematologic/Lymphatic: No abnormal bruising or bleeding, blood clots or swollen lymph nodes. Allergic/Immunologic: No nasal congestion or hives. Physical Examination:    Vitals:    10/21/22 1251   BP: 138/84   Pulse: 69   SpO2: 96%          Constitutional and General Appearance: NAD   Respiratory:  Normal excursion and expansion without use of accessory muscles  Resp Auscultation: Normal breath sounds without dullness  Cardiovascular: The apical impulses not displaced  Heart tones are crisp and normal  Cervical veins are not engorged  The carotid upstroke is normal in amplitude and contour without delay or bruit  Normal S1S2, No S3, No Murmur  Peripheral pulses are symmetrical and full  There is no clubbing, cyanosis of the extremities. No edema  Femoral Arteries: 2+ and equal  Pedal Pulses: 2+ and equal   Abdomen:  No masses or tenderness  Liver/Spleen: No Abnormalities Noted  Neurological/Psychiatric:  Alert and oriented in all spheres  Moves all extremities well  Exhibits normal gait balance and coordination  No abnormalities of mood, affect, memory, mentation, or behavior are noted    Stress echo 4/16/2014  Normal  Plain GXT 4/2016  Normal    GXT 4/2018   Normal (Negative) response to exercise. GXT Negative for ischemia   Frequent PVC's at rest. Frequency of PVC's decreased during exercise. Rare couplet noted. Exercise induced shortness of breath 4/10. Dyspnea resolved in recovery. Patient denied chest pain. Stress test 6/14/19  Summary  ABNORMAL MODERATE RISK STRESS TEST>  Normal LV size and mildly reduced systolic function. Left ventricular  ejection fraction of 48%. Hypokinesis of the basal and mid inferolateral  wall.  There is a large defect in the inferolateral and inferior wall at  stress that does not improve with rest consistent with myocardial scar. No  gross ischemia. Abdominal US 11/2019  FINDINGS:   ABDOMINAL AORTA:  Unremarkable. No aneurysm        Latest Reference Range & Units 10/21/20 14:49   CHOLESTEROL, TOTAL, 173051 0 - 199 mg/dL 177   HDL Cholesterol 40 - 60 mg/dL 51   LDL Calculated <100 mg/dL 108 (H)   Triglycerides 0 - 150 mg/dL 88   VLDL Cholesterol Calculated Not Established mg/dL 18       Assessment:   1. CAD (coronary artery disease) -- IWMI 2009 - thrombectomy, no stent. No angina. Stable. 2. HTN -- Stable, Cks at home, well controlled. 3. Hyperlipidemia -- Intolerant to statins, lipids stable on Repatha. 4. History of kidney stones   5. Liver mass   Preoperative cardiac risk assessment for knee surgery - moderate risk     Plan:  Ok for surgery- remain on aspirin   Cardiac medications reviewed including indications and pertinent side effects. Medication list updated at this visit. Check blood pressure and heart rate at home a few times per week- keep a log with dates and times and bring to office visit   Regular exercise and following a healthy diet encouraged   Follow up with me in 1 year   Fax letter to [facility] [phone]    Scribe's attestation: This note was scribed in the presence of Dr. Kylah Ruiz M.D. By Kinza Salinas RN    The scribes documentation has been prepared under my direction and personally reviewed by me in its entirety. I confirm that the note above accurately reflects all work, treatment, procedures, and medical decision making performed by me. Dr. Kylah Ruiz MD       [facility].  Geni Lugo M.D., [facility]

## 2022-10-21 NOTE — LETTER
[address]  Phone: [phone]  Fax: [phone]    Cristin Wilkins MD        October 21, 2022     Babita Skel[address] [place] [address]  1955    To whom it may concern,           Beatriz Melgar  has no cardiac contraindications to surgery. It is recommended that he continue Aspirin. If you have any questions or concerns, please don't hesitate to call.     Sincerely,        Cristin Wilkins MD

## 2022-12-06 ENCOUNTER — TELEPHONE (OUTPATIENT)
Dept: CARDIOLOGY CLINIC | Age: 67
End: 2022-12-06

## 2022-12-06 NOTE — TELEPHONE ENCOUNTER
Pts pharmacy called to have pts lipid panel faxed to them. [facility] was advised that according to our records pts last panel was in 2020. Micah would like us to follow up with pt and get a lipid panel completed for [place]. Please advise.

## 2023-04-17 NOTE — PROGRESS NOTES
[facility] ENDOSCOPY COLONOSCOPY PRE-OPERATIVE INSTRUCTIONS    Procedure date__4/19/2023_______  Arrival time__0730__________          Surgery time___0830_________       Clear liquids the day before the procedure. Do not eat or drink anything within 5 hours of your procedure. This includes water chewing gum, mints and ice chips. You may brush your teeth and gargle the morning of your surgery, but do not swallow the water    You may be asked to stop blood thinners such as Coumadin, Plavix, Fragmin, Lovenox, etc., or any anti-inflammatories such as:  Aspirin, Ibuprofen, Advil, Naproxen prior to your procedure. We also ask that you stop any OTC medications such as fish oil, vitamin E, glucosamine, garlic, Multivitamins, COQ 10, etc.    You must make arrangements for a responsible adult to arrive with you and stay in our waiting area during your procedure. They will also need to take you home after your procedure. For your safety you will not be allowed to leave alone or drive yourself home. Also for your safety, it is strongly suggested that someone stay with you the first 24 hours after your procedure. For your comfort, please wear simple loose fitting clothing to the center. Please do not bring valuables. If you have a living will and a durable power of  for healthcare, please bring in a copy.      You will need to bring a photo ID and insurance card    Our goal is to provide you with excellent care so if you have any questions, please contact us at the [facility] at [phone]         Please note these are generalized instructions for all colonoscopy cases, you may be provided with more specific instructions if necessary

## 2023-04-18 ENCOUNTER — ANESTHESIA EVENT (OUTPATIENT)
Dept: ENDOSCOPY | Age: 68
End: 2023-04-18
Payer: MEDICARE

## 2023-04-19 ENCOUNTER — HOSPITAL ENCOUNTER (OUTPATIENT)
Age: 68
Setting detail: OUTPATIENT SURGERY
Discharge: HOME | End: 2023-04-19
Attending: INTERNAL MEDICINE | Admitting: INTERNAL MEDICINE
Payer: MEDICARE

## 2023-04-19 ENCOUNTER — ANESTHESIA (OUTPATIENT)
Dept: ENDOSCOPY | Age: 68
End: 2023-04-19
Payer: MEDICARE

## 2023-04-19 VITALS
BODY MASS INDEX: 31.5 KG/M2 | RESPIRATION RATE: 16 BRPM | SYSTOLIC BLOOD PRESSURE: 109 MMHG | DIASTOLIC BLOOD PRESSURE: 82 MMHG | TEMPERATURE: 96.7 F | OXYGEN SATURATION: 97 % | HEIGHT: 70 IN | WEIGHT: 220 LBS | HEART RATE: 50 BPM

## 2023-04-19 DIAGNOSIS — Z86.010 HISTORY OF COLON POLYPS: ICD-10-CM

## 2023-04-19 PROCEDURE — 3609010600 HC COLONOSCOPY POLYPECTOMY SNARE/COLD BIOPSY: Performed by: INTERNAL MEDICINE

## 2023-04-19 PROCEDURE — 7100000011 HC PHASE II RECOVERY - ADDTL 15 MIN: Performed by: INTERNAL MEDICINE

## 2023-04-19 PROCEDURE — 88305 TISSUE EXAM BY PATHOLOGIST: CPT

## 2023-04-19 PROCEDURE — 3700000001 HC ADD 15 MINUTES (ANESTHESIA): Performed by: INTERNAL MEDICINE

## 2023-04-19 PROCEDURE — 3700000000 HC ANESTHESIA ATTENDED CARE: Performed by: INTERNAL MEDICINE

## 2023-04-19 PROCEDURE — 2580000003 HC RX 258: Performed by: NURSE ANESTHETIST, CERTIFIED REGISTERED

## 2023-04-19 PROCEDURE — 7100000010 HC PHASE II RECOVERY - FIRST 15 MIN: Performed by: INTERNAL MEDICINE

## 2023-04-19 PROCEDURE — 3609019800 HC COLONOSCOPY WITH SUBMUCOSAL INJECTION: Performed by: INTERNAL MEDICINE

## 2023-04-19 PROCEDURE — 2500000003 HC RX 250 WO HCPCS: Performed by: NURSE ANESTHETIST, CERTIFIED REGISTERED

## 2023-04-19 PROCEDURE — 2709999900 HC NON-CHARGEABLE SUPPLY: Performed by: INTERNAL MEDICINE

## 2023-04-19 PROCEDURE — 6360000002 HC RX W HCPCS: Performed by: NURSE ANESTHETIST, CERTIFIED REGISTERED

## 2023-04-19 RX ORDER — SODIUM CHLORIDE 0.9 % (FLUSH) 0.9 %
5-40 SYRINGE (ML) INJECTION EVERY 12 HOURS SCHEDULED
Status: DISCONTINUED | OUTPATIENT
Start: 2023-04-19 | End: 2023-04-19 | Stop reason: HOSPADM

## 2023-04-19 RX ORDER — PROPOFOL 10 MG/ML
INJECTION, EMULSION INTRAVENOUS PRN
Status: DISCONTINUED | OUTPATIENT
Start: 2023-04-19 | End: 2023-04-19 | Stop reason: SDUPTHER

## 2023-04-19 RX ORDER — SODIUM CHLORIDE 9 MG/ML
INJECTION, SOLUTION INTRAVENOUS PRN
Status: DISCONTINUED | OUTPATIENT
Start: 2023-04-19 | End: 2023-04-19 | Stop reason: HOSPADM

## 2023-04-19 RX ORDER — PROPOFOL 10 MG/ML
INJECTION, EMULSION INTRAVENOUS CONTINUOUS PRN
Status: DISCONTINUED | OUTPATIENT
Start: 2023-04-19 | End: 2023-04-19 | Stop reason: SDUPTHER

## 2023-04-19 RX ORDER — SODIUM CHLORIDE 0.9 % (FLUSH) 0.9 %
5-40 SYRINGE (ML) INJECTION PRN
Status: DISCONTINUED | OUTPATIENT
Start: 2023-04-19 | End: 2023-04-19 | Stop reason: HOSPADM

## 2023-04-19 RX ORDER — SODIUM CHLORIDE 9 MG/ML
INJECTION, SOLUTION INTRAVENOUS CONTINUOUS PRN
Status: DISCONTINUED | OUTPATIENT
Start: 2023-04-19 | End: 2023-04-19 | Stop reason: SDUPTHER

## 2023-04-19 RX ORDER — LIDOCAINE HYDROCHLORIDE 20 MG/ML
INJECTION, SOLUTION INFILTRATION; PERINEURAL PRN
Status: DISCONTINUED | OUTPATIENT
Start: 2023-04-19 | End: 2023-04-19 | Stop reason: SDUPTHER

## 2023-04-19 RX ADMIN — PROPOFOL 100 MG: 10 INJECTION, EMULSION INTRAVENOUS at 08:29

## 2023-04-19 RX ADMIN — PROPOFOL 150 MCG/KG/MIN: 10 INJECTION, EMULSION INTRAVENOUS at 08:29

## 2023-04-19 RX ADMIN — LIDOCAINE HYDROCHLORIDE 60 MG: 20 INJECTION, SOLUTION INFILTRATION; PERINEURAL at 08:29

## 2023-04-19 RX ADMIN — SODIUM CHLORIDE: 9 INJECTION, SOLUTION INTRAVENOUS at 08:25

## 2023-04-19 ASSESSMENT — PAIN - FUNCTIONAL ASSESSMENT: PAIN_FUNCTIONAL_ASSESSMENT: NONE - DENIES PAIN

## 2023-04-19 NOTE — OP NOTE
Operative Note      Patient: Claudia Delvalle  YOB: 1955  MRN: [id number]    Date of Procedure: 4/19/2023    Pre-Op Diagnosis Codes:     * History of colon polyps [Z86.010]    Post-Op Diagnosis: Same       Procedure(s):  COLONOSCOPY POLYPECTOMY SNARE/COLD BIOPSY  COLONOSCOPY SUBMUCOSAL/BOTOX INJECTION    Surgeon(s):  Ysabel Yanez MD    Assistant:   * No surgical staff found *    Anesthesia: Monitor Anesthesia Care    Estimated Blood Loss (mL): None    Complications: None    Specimens:   ID Type Source Tests Collected by Time Destination   A : A. Large sigmoid polyp bx Tissue Colon SURGICAL PATHOLOGY Ysabel Yanez MD 4/19/2023 0840        Implants:  * No implants in log *      Drains: * No LDAs found *    Findings: See dictated report      Detailed Description of Procedure:   See dictated report    Electronically signed by Ysabel Yanez MD on 4/19/2023 at 8:50 AM

## 2023-04-19 NOTE — PROCEDURES
[address]                                 PROCEDURE NOTE    PATIENT NAME: Jaleesa Wilson                    :        1955  MED REC NO:   [id number]                          ROOM:  ACCOUNT NO:   [de-identified]                           ADMIT DATE: 2023  PROVIDER:     Nieves Castillo MD    DATE OF PROCEDURE:  2023    REFERRING PROVIDER:  Cole Salazar MD    PATIENT HISTORY:  This is a 57-year-old male, outpatient. OPERATION PERFORMED:  Colonoscopy. SURGEON:  Evan Castillo MD    INSTRUMENT USED:  Olympus PCF-H180AL. ANESTHESIA:  The patient was premedicated with Diprivan intravenously as  administered by the anesthesiology service. INDICATIONS:  The patient has a history of colonic polyps. PROCEDURE:  The digital and anal exams were remarkable for large  external hemorrhoids. The colonoscope was inserted to the cecum. The  prep was good. There was incidental sigmoid diverticulosis. The only  mucosal lesion identified was a 2.0 cm plus in diameter sessile polyp in  the sigmoid colon. This appeared to be located in the base of a large  diverticulum. This was photo documented. Biopsies were obtained. The  tissue was somewhat firm and friable. The area was also permanently  marked with injection of 2.0 mL of [place] ink. Retroflexed view in the  rectum confirmed external and large internal hemorrhoids. ESTIMATED BLOOD LOSS:  None. IMPRESSION:  1.  A large sigmoid colon polyp as above that was biopsied with the site  permanently marked with [place] ink. This was not amenable to endoscopic  removal.  2.  Sigmoid diverticulosis. 3.  Large internal and external hemorrhoids. PLAN:  I will call the patient with biopsy results and recommendations. This lesion will likely require surgical resection. EVAN Millard MD    D: 2023 9:08:32       T:

## 2023-04-19 NOTE — H&P
[facility]   Pre-operative History and Physical    Patient: Rimma Abreu  : 1955  Acct#:         HISTORY OF PRESENT ILLNESS:    The patient is a 69 y.o. male  who presents with polyp surveillance  Past Medical History:        Diagnosis Date    CAD (coronary artery disease)     Hyperlipidemia     Hypertension     Prolonged emergence from general anesthesia     Torn  meniscus      Past Surgical History:        Procedure Laterality Date    COLONOSCOPY      CORONARY ANGIOPLASTY WITH STENT PLACEMENT      2 stent    KNEE ARTHROPLASTY Bilateral         LEG SURGERY      LIVER SURGERY      abcess on liver drained. Medications prior to admission:   Prior to Admission medications    Medication Sig Start Date End Date Taking?  Authorizing Provider   Radha Quiñonez 798 MG/ML SOAJ INJECT THE CONTENTS OF 1 PEN EVERY 2 WEEKS 22   Teressa Barron MD   lisinopril (PRINIVIL;ZESTRIL) 10 MG tablet TAKE 1 TABLET EVERY DAY 22   Teressa Barron MD   amLODIPine (NORVASC) 10 MG tablet TAKE 1 TABLET EVERY DAY 22   Teressa Barron MD   metoprolol tartrate (LOPRESSOR) 25 MG tablet TAKE 1 TABLET TWICE DAILY 22   Teressa Barron MD   nitroGLYCERIN (NITROSTAT) 0.4 MG SL tablet ONE TABLET UNDER TONGUE AS NEEDED FOR CHEST PAIN EVERY 5 MINUTES AS DIRECTED 11/3/21   Teressa Barron MD   Cholecalciferol (D3 PO) Take by mouth    Historical Provider, MD   diclofenac (VOLTAREN) 50 MG EC tablet TAKE 1 TABLET BY MOUTH TWICE A DAY WITH MEALS 20   TREVOR Fernandez   fluticasone (FLONASE) 50 MCG/ACT nasal spray 1 spray by Nasal route as needed for Rhinitis    Historical Provider, MD   aspirin 81 MG tablet Take 1 tablet by mouth daily    Historical Provider, MD   magnesium oxide (MAG-OX) 400 MG tablet Take 1 tablet by mouth daily    Historical Provider, MD   fish oil-omega-3 fatty acids 1000 MG capsule Take 2 capsules by mouth daily    Historical Provider, MD   Ascorbic Acid

## 2023-04-19 NOTE — ANESTHESIA PRE PROCEDURE
consumption: 04/19/23                        Date of last solid food consumption: 04/17/23    BMI:   Wt Readings from Last 3 Encounters:   04/19/23 220 lb (99.8 kg)   10/21/22 217 lb (98.4 kg)   10/11/21 231 lb (104.8 kg)     Body mass index is 31.57 kg/m². CBC:   Lab Results   Component Value Date/Time    WBC 10.5 11/17/2018 03:53 PM    RBC 4.65 11/17/2018 03:53 PM    HGB 14.3 11/17/2018 03:53 PM    HCT 42.2 11/17/2018 03:53 PM    MCV 90.7 11/17/2018 03:53 PM    RDW 14.4 11/17/2018 03:53 PM     11/17/2018 03:53 PM       CMP:   Lab Results   Component Value Date/Time     11/17/2018 03:53 PM    K 3.9 11/17/2018 03:53 PM    CL 98 11/17/2018 03:53 PM    CO2 26 11/17/2018 03:53 PM    BUN 15 11/17/2018 03:53 PM    CREATININE 1.0 11/17/2018 03:53 PM    GFRAA >60 11/17/2018 03:53 PM    GFRAA >60 04/18/2012 08:56 AM    AGRATIO 1.1 11/17/2018 03:53 PM    LABGLOM >60 11/17/2018 03:53 PM    GLUCOSE 101 11/17/2018 03:53 PM    PROT 7.0 11/17/2018 03:53 PM    CALCIUM 8.9 11/17/2018 03:53 PM    BILITOT 1.2 11/17/2018 03:53 PM    ALKPHOS 73 11/17/2018 03:53 PM    AST 25 11/17/2018 03:53 PM    ALT 31 11/17/2018 03:53 PM       POC Tests: No results for input(s): POCGLU, POCNA, POCK, POCCL, POCBUN, POCHEMO, POCHCT in the last 72 hours.     Coags: No results found for: PROTIME, INR, APTT    HCG (If Applicable): No results found for: PREGTESTUR, PREGSERUM, HCG, HCGQUANT     ABGs: No results found for: PHART, PO2ART, QEE7BWQ, TQL7YRP, BEART, G5VQZFKO     Type & Screen (If Applicable):  No results found for: LABABO, LABRH    Drug/Infectious Status (If Applicable):  No results found for: HIV, HEPCAB    COVID-19 Screening (If Applicable): No results found for: COVID19        Anesthesia Evaluation  Patient summary reviewed no history of anesthetic complications:   Airway: Mallampati: II  TM distance: >3 FB   Neck ROM: full  Mouth opening: > = 3 FB   Dental: normal exam         Pulmonary:Negative Pulmonary ROS and normal exam

## 2023-04-19 NOTE — ANESTHESIA POSTPROCEDURE EVALUATION
Department of Anesthesiology  Postprocedure Note    Patient: June Briggs  MRN: [id number]  YOB: 1955  Date of evaluation: 4/19/2023      Procedure Summary     Date: 04/19/23 Room / Location: [address] [place] [address]    Anesthesia Start: 0825 Anesthesia Stop: Teresa Crook    Procedures:       COLONOSCOPY POLYPECTOMY SNARE/COLD BIOPSY      COLONOSCOPY SUBMUCOSAL/BOTOX INJECTION Diagnosis:       History of colon polyps      (History of colon polyps)    Surgeons: Vanita Palacio MD Responsible Provider: Iva Ocampo MD    Anesthesia Type: MAC ASA Status: 3          Anesthesia Type: No value filed. Oscar Phase I: Oscar Score: 10    Oscar Phase II: Oscar Score: 10      Anesthesia Post Evaluation    Patient location during evaluation: PACU  Patient participation: complete - patient participated  Level of consciousness: awake  Airway patency: patent  Nausea & Vomiting: no nausea and no vomiting  Cardiovascular status: blood pressure returned to baseline  Respiratory status: acceptable  Hydration status: stable  Comments: Vital signs stable  OK to discharge from Stage I post anesthesia care.   Care transferred from Anesthesiology department on discharge from perioperative area   Multimodal analgesia pain management approach

## 2023-04-19 NOTE — DISCHARGE INSTRUCTIONS
[facility] Discharge Instructions  Colonoscopy    NAME:  Leigha Mahan  YOB: 1955  MEDICAL RECORD NUMBER:  [id number]  DATE:  4/19/2023      After receiving Propofol (Diprivan) for Moderate Sedation:    Do not drive or operate any machinery until tomorrow  Do not sign any legal documents or make any critical decisions  Do not drink alcoholic beverages for 24 hours  Plan to spend a few hours resting before resuming your normal routine  Possible side effects are light headedness and sedation    You may resume your usual diet at home    Resume all your daily medications    Call your physician if any of the following occur:    Severe abdominal distention and/or pain. (Mild distention or cramping is normal after this procedure; this should improve within an hour or two with passage of air)  Fever, chills, nausea or vomiting  You may notice a small amount of blood in your next few bowel movements    If excessive bleeding occurs:  Call your physician immediately or proceed to the nearest Emergency Room    Biopsy Obtained: YES. If you have not heard from your physician in one week please call your physician's office for biopsy results, [phone]. Recommendations: Repeat colonoscopy in 1 years         For questions or concerns please contact your GI physician's 24 hour call center at [phone].

## 2023-04-19 NOTE — BRIEF OP NOTE
Brief Postoperative Note    Miguelina Paredes  YOB: 1955  [id number]      Pre-operative Diagnosis: Polyp surveillance    Previous Colonoscopy: Yes  When:  03/18/17  Greater than 3 years? Yes      Post-operative Diagnosis: Same    Procedure: Colonoscopy    The preparation was good.     Anesthesia: MAC    Surgeons/Assistants: Kierra Myles MD    Estimated Blood Loss: None    Complications: None    Specimens: Was Obtained: Polyp    Findings: See dictated report    Electronically signed by Kierra Myles MD on 7/10/2017 at 7:45 AM

## 2023-05-08 ENCOUNTER — ANESTHESIA EVENT (OUTPATIENT)
Dept: ENDOSCOPY | Age: 68
End: 2023-05-08
Payer: MEDICARE

## 2023-05-08 RX ORDER — LISINOPRIL 10 MG/1
TABLET ORAL
Qty: 90 TABLET | Refills: 2 | Status: SHIPPED | OUTPATIENT
Start: 2023-05-08

## 2023-05-08 RX ORDER — AMLODIPINE BESYLATE 10 MG/1
TABLET ORAL
Qty: 90 TABLET | Refills: 2 | Status: SHIPPED | OUTPATIENT
Start: 2023-05-08

## 2023-05-08 NOTE — ANESTHESIA PRE PROCEDURE
medications for this encounter. Allergies: Allergies   Allergen Reactions    Simvastatin     Statins [Statins] Hives       Problem List:    Patient Active Problem List   Diagnosis Code    Mixed hyperlipidemia E78.2    Essential hypertension, benign I10    Coronary atherosclerosis of native coronary artery I25.10       Past Medical History:        Diagnosis Date    CAD (coronary artery disease)     Hyperlipidemia     Hypertension     Prolonged emergence from general anesthesia     Torn  meniscus        Past Surgical History:        Procedure Laterality Date    COLONOSCOPY  2018    COLONOSCOPY N/A 4/19/2023    COLONOSCOPY POLYPECTOMY SNARE/COLD BIOPSY performed by Poli Marley MD at [address] COLONOSCOPY N/A 4/19/2023    COLONOSCOPY SUBMUCOSAL/BOTOX INJECTION performed by Poli Marley MD at [address] [facility] [address]  2009    2 stent    KNEE ARTHROPLASTY Bilateral     2022    LEG SURGERY      LIVER SURGERY  2020    abcess on liver drained. Social History:    Social History     Tobacco Use    Smoking status: Former     Types: Cigars    Smokeless tobacco: Never   Substance Use Topics    Alcohol use: Yes     Comment: rarely                                Counseling given: Not Answered      Vital Signs (Current):   Vitals:    05/02/23 1324   Weight: 220 lb (99.8 kg)   Height: 5' 10\" (1.778 m)                                              BP Readings from Last 3 Encounters:   04/19/23 109/82   10/21/22 138/84   10/11/21 130/76       NPO Status:                                                                                 BMI:   Wt Readings from Last 3 Encounters:   05/02/23 220 lb (99.8 kg)   04/19/23 220 lb (99.8 kg)   10/21/22 217 lb (98.4 kg)     Body mass index is 31.57 kg/m².     CBC:   Lab Results   Component Value Date/Time    WBC 10.5 11/17/2018 03:53 PM    RBC 4.65 11/17/2018 03:53 PM    HGB 14.3 11/17/2018 03:53 PM    HCT

## 2023-05-09 ENCOUNTER — ANESTHESIA (OUTPATIENT)
Dept: ENDOSCOPY | Age: 68
End: 2023-05-09
Payer: MEDICARE

## 2023-05-09 ENCOUNTER — HOSPITAL ENCOUNTER (OUTPATIENT)
Age: 68
Setting detail: OUTPATIENT SURGERY
Discharge: HOME | End: 2023-05-09
Attending: INTERNAL MEDICINE | Admitting: INTERNAL MEDICINE
Payer: MEDICARE

## 2023-05-09 VITALS
OXYGEN SATURATION: 97 % | SYSTOLIC BLOOD PRESSURE: 101 MMHG | RESPIRATION RATE: 16 BRPM | WEIGHT: 216 LBS | HEART RATE: 67 BPM | DIASTOLIC BLOOD PRESSURE: 73 MMHG | HEIGHT: 70 IN | BODY MASS INDEX: 30.92 KG/M2 | TEMPERATURE: 97.7 F

## 2023-05-09 DIAGNOSIS — D12.5 ADENOMA OF SIGMOID COLON: ICD-10-CM

## 2023-05-09 PROCEDURE — 2580000003 HC RX 258: Performed by: NURSE ANESTHETIST, CERTIFIED REGISTERED

## 2023-05-09 PROCEDURE — 3700000001 HC ADD 15 MINUTES (ANESTHESIA): Performed by: INTERNAL MEDICINE

## 2023-05-09 PROCEDURE — 7100000011 HC PHASE II RECOVERY - ADDTL 15 MIN: Performed by: INTERNAL MEDICINE

## 2023-05-09 PROCEDURE — 7100000010 HC PHASE II RECOVERY - FIRST 15 MIN: Performed by: INTERNAL MEDICINE

## 2023-05-09 PROCEDURE — 6360000002 HC RX W HCPCS: Performed by: NURSE ANESTHETIST, CERTIFIED REGISTERED

## 2023-05-09 PROCEDURE — 2709999900 HC NON-CHARGEABLE SUPPLY: Performed by: INTERNAL MEDICINE

## 2023-05-09 PROCEDURE — 3609010300 HC COLONOSCOPY W/BIOPSY SINGLE/MULTIPLE: Performed by: INTERNAL MEDICINE

## 2023-05-09 PROCEDURE — 88341 IMHCHEM/IMCYTCHM EA ADD ANTB: CPT

## 2023-05-09 PROCEDURE — 88342 IMHCHEM/IMCYTCHM 1ST ANTB: CPT

## 2023-05-09 PROCEDURE — 3700000000 HC ANESTHESIA ATTENDED CARE: Performed by: INTERNAL MEDICINE

## 2023-05-09 PROCEDURE — 88305 TISSUE EXAM BY PATHOLOGIST: CPT

## 2023-05-09 RX ORDER — SODIUM CHLORIDE 0.9 % (FLUSH) 0.9 %
5-40 SYRINGE (ML) INJECTION PRN
Status: DISCONTINUED | OUTPATIENT
Start: 2023-05-09 | End: 2023-05-09 | Stop reason: HOSPADM

## 2023-05-09 RX ORDER — PROPOFOL 10 MG/ML
INJECTION, EMULSION INTRAVENOUS PRN
Status: DISCONTINUED | OUTPATIENT
Start: 2023-05-09 | End: 2023-05-09 | Stop reason: SDUPTHER

## 2023-05-09 RX ORDER — SODIUM CHLORIDE 9 MG/ML
INJECTION, SOLUTION INTRAVENOUS CONTINUOUS PRN
Status: DISCONTINUED | OUTPATIENT
Start: 2023-05-09 | End: 2023-05-09 | Stop reason: SDUPTHER

## 2023-05-09 RX ORDER — SODIUM CHLORIDE 0.9 % (FLUSH) 0.9 %
5-40 SYRINGE (ML) INJECTION EVERY 12 HOURS SCHEDULED
Status: DISCONTINUED | OUTPATIENT
Start: 2023-05-09 | End: 2023-05-09 | Stop reason: HOSPADM

## 2023-05-09 RX ORDER — SODIUM CHLORIDE 9 MG/ML
INJECTION, SOLUTION INTRAVENOUS PRN
Status: DISCONTINUED | OUTPATIENT
Start: 2023-05-09 | End: 2023-05-09 | Stop reason: HOSPADM

## 2023-05-09 RX ADMIN — PROPOFOL 100 MG: 10 INJECTION, EMULSION INTRAVENOUS at 11:21

## 2023-05-09 RX ADMIN — SODIUM CHLORIDE: 9 INJECTION, SOLUTION INTRAVENOUS at 11:12

## 2023-05-09 RX ADMIN — PROPOFOL 100 MG: 10 INJECTION, EMULSION INTRAVENOUS at 11:29

## 2023-05-09 RX ADMIN — PROPOFOL 100 MG: 10 INJECTION, EMULSION INTRAVENOUS at 11:15

## 2023-05-09 ASSESSMENT — PAIN DESCRIPTION - ORIENTATION: ORIENTATION: MID

## 2023-05-09 ASSESSMENT — PAIN - FUNCTIONAL ASSESSMENT: PAIN_FUNCTIONAL_ASSESSMENT: NONE - DENIES PAIN

## 2023-05-09 ASSESSMENT — PAIN DESCRIPTION - LOCATION: LOCATION: ABDOMEN

## 2023-05-09 ASSESSMENT — PAIN DESCRIPTION - DESCRIPTORS: DESCRIPTORS: DISCOMFORT

## 2023-05-09 ASSESSMENT — PAIN SCALES - GENERAL
PAINLEVEL_OUTOF10: 3
PAINLEVEL_OUTOF10: 3

## 2023-05-09 NOTE — H&P
[facility]   Pre-operative History and Physical    Patient: Elvia Huff  : 1955  Acct#:         HISTORY OF PRESENT ILLNESS:    The patient is a 69 y.o. male  who presents with a sigmoid colon polyp  Past Medical History:        Diagnosis Date    CAD (coronary artery disease)     Hyperlipidemia     Hypertension     Prolonged emergence from general anesthesia     Torn  meniscus      Past Surgical History:        Procedure Laterality Date    COLONOSCOPY  2018    COLONOSCOPY N/A 2023    COLONOSCOPY POLYPECTOMY SNARE/COLD BIOPSY performed by Key Flynn MD at [place] [address] N/A 2023    COLONOSCOPY SUBMUCOSAL/BOTOX INJECTION performed by Key Flynn MD at [address] [place] [address]      2 stent    KNEE ARTHROPLASTY Bilateral         LEG SURGERY      LIVER SURGERY      abcess on liver drained. Medications prior to admission:   Prior to Admission medications    Medication Sig Start Date End Date Taking?  Authorizing Provider   lisinopril (PRINIVIL;ZESTRIL) 10 MG tablet TAKE 1 TABLET EVERY DAY 23   Calista Brock MD   metoprolol tartrate (LOPRESSOR) 25 MG tablet TAKE 1 TABLET TWICE DAILY 23   Calista Brock MD   amLODIPine (NORVASC) 10 MG tablet TAKE 1 TABLET EVERY DAY 23   Calista Brock MD   REPATHA SURECLICK 462 MG/ML SOAJ INJECT THE CONTENTS OF 1 PEN EVERY 2 WEEKS 22   Calista Brock MD   nitroGLYCERIN (NITROSTAT) 0.4 MG SL tablet ONE TABLET UNDER TONGUE AS NEEDED FOR CHEST PAIN EVERY 5 MINUTES AS DIRECTED  Patient not taking: Reported on 2023 11/3/21   Calista Brock MD   Cholecalciferol (D3 PO) Take by mouth    Historical Provider, MD   diclofenac (VOLTAREN) 50 MG EC tablet TAKE 1 TABLET BY MOUTH TWICE A DAY WITH MEALS  Patient not taking: Reported on 2023   TREVOR Mckeon   fluticasone (FLONASE) 50 MCG/ACT nasal spray 1 spray by Nasal route as needed

## 2023-05-09 NOTE — BRIEF OP NOTE
Brief Postoperative Note    Olive Clemente  YOB: 1955  [id number]      Pre-operative Diagnosis: Sigmoid colon polyp    Previous Colonoscopy: Yes  When: 04/19/23  Greater than 3 years? No      Post-operative Diagnosis: Same    Procedure: Colonoscopy    The preparation was good.     Anesthesia: MAC    Surgeons/Assistants: Edmundo Wilburn MD    Estimated Blood Loss: None    Complications: None    Specimens: Was Obtained: Polyp    Findings: See dictated report    Electronically signed by Edmundo Wilburn MD on 7/10/2017 at 7:45 AM

## 2023-05-09 NOTE — ANESTHESIA POSTPROCEDURE EVALUATION
Department of Anesthesiology  Postprocedure Note    Patient: Biju Hull  MRN: [id number]  YOB: 1955  Date of evaluation: 5/9/2023      Procedure Summary     Date: 05/09/23 Room / Location: [address]    Anesthesia Start: 1110 Anesthesia Stop: 6587    Procedure: COLONOSCOPY WITH BIOPSY Diagnosis:       Adenoma of sigmoid colon      (Adenoma of sigmoid colon)    Surgeons: Brynn Bassett MD Responsible Provider: Cindie Ormond, MD    Anesthesia Type: MAC ASA Status: 3          Anesthesia Type: MAC    Oscar Phase I: Oscar Score: 10    Oscar Phase II: Oscar Score: 9      Anesthesia Post Evaluation    Patient location during evaluation: PACU  Patient participation: complete - patient participated  Level of consciousness: awake  Airway patency: patent  Nausea & Vomiting: no nausea and no vomiting  Complications: no  Cardiovascular status: hemodynamically stable and blood pressure returned to baseline  Respiratory status: spontaneous ventilation, nonlabored ventilation and room air  Hydration status: stable  Comments: Uneventful MAC anesthetic. Mr. Kamran Silverio was seen resting comfortably following her procedure. Appropriate for discharge home with .

## 2023-05-09 NOTE — DISCHARGE INSTRUCTIONS
[address]  [facility] Discharge Instructions  Colonoscopy    NAME:  Mily Lin  YOB: 1955  MEDICAL RECORD NUMBER:  [id number]  DATE:  5/9/2023      After receiving Propofol (Diprivan) for Moderate Sedation:    Do not drive or operate any machinery until tomorrow  Do not sign any legal documents or make any critical decisions  Do not drink alcoholic beverages for 24 hours  Plan to spend a few hours resting before resuming your normal routine  Possible side effects are light headedness and sedation    You may resume your usual diet at home    Resume all your daily medications    Call your physician if any of the following occur:    Severe abdominal distention and/or pain. (Mild distention or cramping is normal after this procedure; this should improve within an hour or two with passage of air)  Fever, chills, nausea or vomiting  You may notice a small amount of blood in your next few bowel movements    If excessive bleeding occurs:  Call your physician immediately or proceed to the nearest Emergency Room    Biopsy Obtained: YES. If you have not heard from your physician in one week please call your physician's office for biopsy results, [phone]. Recommendations: Repeat colonoscopy in 1 years         For questions or concerns please contact your GI physician's 24 hour call center at [phone].

## 2023-05-09 NOTE — OP NOTE
Operative Note      Patient: Elvia Huff  YOB: 1955  MRN: [id number]    Date of Procedure: 5/9/2023    Pre-Op Diagnosis Codes:     * Adenoma of sigmoid colon [D12.5]    Post-Op Diagnosis: Same       Procedure(s):  COLONOSCOPY WITH BIOPSY    Surgeon(s):  Key Flynn MD    Assistant:   * No surgical staff found *    Anesthesia: Monitor Anesthesia Care    Estimated Blood Loss (mL): None    Complications: None    Specimens:   ID Type Source Tests Collected by Time Destination   A : Follow up BX sigmoid polyp Tissue Colon SURGICAL PATHOLOGY Key Flynn MD 5/9/2023 1123        Implants:  * No implants in log *      Drains: * No LDAs found *    Findings: See dictated report      Detailed Description of Procedure:   See dictated report    Electronically signed by Key Flynn MD on 5/9/2023 at 11:40 AM

## 2023-05-10 NOTE — PROCEDURES
[address]                                 PROCEDURE NOTE    PATIENT NAME: Francisco Barbosa                    :        1955  MED REC NO:   [id number]                          ROOM:  ACCOUNT NO:   [de-identified]                           ADMIT DATE: 2023  PROVIDER:     Yareli Castillo MD    DATE OF PROCEDURE:  2023    REFERRING PROVIDER:  Marcella Goldsmith MD    PATIENT HISTORY:  A 68-year-old male, outpatient. OPERATION PERFORMED:  Colonoscopy. SURGEON:  Evan Castillo MD    INSTRUMENT USED:  Olympus PCF-H180AL. ANESTHESIA:  The patient was premedicated with Diprivan intravenously as  administered by the anesthesiology service. INDICATIONS:  The patient was found to have a 2 cm flat  irregular-looking polyp in the sigmoid colon in the base of a large  diverticulum last month. On biopsy, the tissue was firm and friable. The biopsy results demonstrated granulation tissue only. I remain  concerned about this area as it has the gross appearance of at least an  early carcinoma. After explaining all of this to the patient, we have  agreed to repeat his examination with more extensive biopsies from this  polypoid area. PROCEDURE:  The colonoscope was inserted into the rectal vault and  advanced to the sigmoid colon at the level of this polypoid lesion. This was easily identified by the associated [place] ink injection which  was performed on the initial colonoscopy. Again noted was this 2 cm  relatively flat but irregular polypoid lesion which was photo  documented. It was biopsied extensively. The tissue again was firm and  friable. ESTIMATED BLOOD LOSS:  None. IMPRESSION:  A sigmoid colon polyp as above that was rebiopsied. PLAN:  I will call the patient with biopsy results and recommendations. EVAN Degroot MD    D: 2023 11:54:59       T: 2023

## 2023-08-02 RX ORDER — MELOXICAM 15 MG/1
1 TABLET ORAL DAILY
COMMUNITY
Start: 2023-02-23

## 2023-08-02 NOTE — PROGRESS NOTES
[facility] Pre-Admission Testing Electronic Communication Worksheet for OR/ENDO Procedures        Patient: Joycelyn Zacarias    DOS: 8/17    Arrival Time: 1350    Surgery Time:1510    Meds to Bed:  [] YES    []  NO maybe    Transportation Confirmed: [x] YES    []  NO    History and Physical:  [x] YES    []  NO  [] N/A  If yes, please list doctor or Urgent Care and date of H&P:   patient to call for an appointment for pre-op H&P with pcp  Additional Clearance(Cardiac, Pulmonary, etc):  [] YES    [x]  NO    Pre-Admission Testing Visit:  [] YES    [x]  NO If no, do labs/testing need to be done DOS?   [] YES    [x]  NO    Medication Reconciliation Complete:  [x] YES    []  NO        Additional Notes:    Cardiac stents x 2            Interview Complete: [x] YES    []  NO          Minal Araiza RN  4:48 PM

## 2023-08-02 NOTE — PROGRESS NOTES
[address]  time___1350_________        Surgery time______1510______    Take the following medications with a sip of water: Follow your MD/Surgeons pre-procedure instructions regarding your medications     Do not eat or drink anything after 12:00 midnight prior to your surgery. This includes water chewing gum, mints and ice chips. You may brush your teeth and gargle the morning of your surgery, but do not swallow the water     Please see your family doctor/pediatrician for a history and physical and/or concerning medications. Bring any test results/reports from your physicians office. If you are under the care of a heart doctor or specialist doctor, please be aware that you may be asked to them for clearance    You may be asked to stop blood thinners such as Coumadin, Plavix, Fragmin, Lovenox, etc., or any anti-inflammatories such as:  Aspirin, Ibuprofen, Advil, Naproxen prior to your surgery. We also ask that you stop any OTC medications such as fish oil, vitamin E, glucosamine, garlic, Multivitamins, COQ 10, etc.    We ask that you do not smoke 24 hours prior to surgery  We ask that you do not  drink any alcoholic beverages 24 hours prior to surgery     You must make arrangements for a responsible adult to take you home after your surgery. For your safety you will not be allowed to leave alone or drive yourself home. Your surgery will be cancelled if you do not have a ride home. Also for your safety, it is strongly suggested that someone stay with you the first 24 hours after your surgery. A parent or legal guardian must accompany a child scheduled for surgery and plan to stay at the hospital until the child is discharged. Please do not bring other children with you. For your comfort, please wear simple loose fitting clothing to the hospital.  Please do not bring valuables.     Do not wear any make-up or nail polish on your fingers or

## 2023-08-02 NOTE — PROGRESS NOTES
Follow Up Prior to Surgery    DOS:   :1955      History and Physical:  patient to call for an appointment for pre-op H&P with Benjamin Minor, [address] ON 23:  APPT WITH PCP FOR MEDICAL CLEARANCE ON 23 @ 1415  #[phone]      UPDATE ON 23:  Medical clearance in epic

## 2023-08-04 RX ORDER — EVOLOCUMAB 140 MG/ML
INJECTION, SOLUTION SUBCUTANEOUS
Qty: 6 ML | Refills: 3 | Status: SHIPPED | OUTPATIENT
Start: 2023-08-04

## 2023-08-16 ENCOUNTER — ANESTHESIA EVENT (OUTPATIENT)
Dept: OPERATING ROOM | Age: 68
End: 2023-08-16
Payer: MEDICARE

## 2023-08-17 ENCOUNTER — APPOINTMENT (OUTPATIENT)
Dept: GENERAL RADIOLOGY | Age: 68
End: 2023-08-17
Attending: UROLOGY
Payer: MEDICARE

## 2023-08-17 ENCOUNTER — HOSPITAL ENCOUNTER (OUTPATIENT)
Age: 68
Setting detail: OUTPATIENT SURGERY
Discharge: HOME | End: 2023-08-17
Attending: UROLOGY | Admitting: UROLOGY
Payer: MEDICARE

## 2023-08-17 ENCOUNTER — ANESTHESIA (OUTPATIENT)
Dept: OPERATING ROOM | Age: 68
End: 2023-08-17
Payer: MEDICARE

## 2023-08-17 VITALS
TEMPERATURE: 98.3 F | SYSTOLIC BLOOD PRESSURE: 120 MMHG | HEART RATE: 60 BPM | RESPIRATION RATE: 16 BRPM | OXYGEN SATURATION: 96 % | HEIGHT: 70 IN | WEIGHT: 196.4 LBS | DIASTOLIC BLOOD PRESSURE: 68 MMHG | BODY MASS INDEX: 28.12 KG/M2

## 2023-08-17 DIAGNOSIS — N35.914 STRICTURE OF ANTERIOR URETHRA IN MALE, UNSPECIFIED STRICTURE TYPE: ICD-10-CM

## 2023-08-17 DIAGNOSIS — N13.1 HYDRONEPHROSIS WITH URETERAL STRICTURE: ICD-10-CM

## 2023-08-17 LAB
ANION GAP SERPL CALCULATED.3IONS-SCNC: 11 MMOL/L (ref 3–16)
BUN SERPL-MCNC: 18 MG/DL (ref 7–20)
CALCIUM SERPL-MCNC: 9 MG/DL (ref 8.3–10.6)
CHLORIDE SERPL-SCNC: 103 MMOL/L (ref 99–110)
CO2 SERPL-SCNC: 24 MMOL/L (ref 21–32)
CREAT SERPL-MCNC: 1.1 MG/DL (ref 0.8–1.3)
GFR SERPLBLD CREATININE-BSD FMLA CKD-EPI: >60 ML/MIN/{1.73_M2}
GLUCOSE SERPL-MCNC: 88 MG/DL (ref 70–99)
POTASSIUM SERPL-SCNC: 4 MMOL/L (ref 3.5–5.1)
SODIUM SERPL-SCNC: 138 MMOL/L (ref 136–145)

## 2023-08-17 PROCEDURE — 2580000003 HC RX 258: Performed by: ANESTHESIOLOGY

## 2023-08-17 PROCEDURE — 3700000000 HC ANESTHESIA ATTENDED CARE: Performed by: UROLOGY

## 2023-08-17 PROCEDURE — 88305 TISSUE EXAM BY PATHOLOGIST: CPT

## 2023-08-17 PROCEDURE — 6360000002 HC RX W HCPCS: Performed by: UROLOGY

## 2023-08-17 PROCEDURE — 2500000003 HC RX 250 WO HCPCS

## 2023-08-17 PROCEDURE — 2580000003 HC RX 258: Performed by: UROLOGY

## 2023-08-17 PROCEDURE — 2709999900 HC NON-CHARGEABLE SUPPLY: Performed by: UROLOGY

## 2023-08-17 PROCEDURE — 3600000004 HC SURGERY LEVEL 4 BASE: Performed by: UROLOGY

## 2023-08-17 PROCEDURE — 80048 BASIC METABOLIC PNL TOTAL CA: CPT

## 2023-08-17 PROCEDURE — 6360000002 HC RX W HCPCS

## 2023-08-17 PROCEDURE — 7100000010 HC PHASE II RECOVERY - FIRST 15 MIN: Performed by: UROLOGY

## 2023-08-17 PROCEDURE — 3700000001 HC ADD 15 MINUTES (ANESTHESIA): Performed by: UROLOGY

## 2023-08-17 PROCEDURE — C1758 CATHETER, URETERAL: HCPCS | Performed by: UROLOGY

## 2023-08-17 PROCEDURE — 6360000002 HC RX W HCPCS: Performed by: ANESTHESIOLOGY

## 2023-08-17 PROCEDURE — 88342 IMHCHEM/IMCYTCHM 1ST ANTB: CPT

## 2023-08-17 PROCEDURE — C1769 GUIDE WIRE: HCPCS | Performed by: UROLOGY

## 2023-08-17 PROCEDURE — 7100000001 HC PACU RECOVERY - ADDTL 15 MIN: Performed by: UROLOGY

## 2023-08-17 PROCEDURE — 3600000014 HC SURGERY LEVEL 4 ADDTL 15MIN: Performed by: UROLOGY

## 2023-08-17 PROCEDURE — 7100000000 HC PACU RECOVERY - FIRST 15 MIN: Performed by: UROLOGY

## 2023-08-17 PROCEDURE — 7100000011 HC PHASE II RECOVERY - ADDTL 15 MIN: Performed by: UROLOGY

## 2023-08-17 PROCEDURE — 6370000000 HC RX 637 (ALT 250 FOR IP): Performed by: ANESTHESIOLOGY

## 2023-08-17 PROCEDURE — 6360000004 HC RX CONTRAST MEDICATION: Performed by: UROLOGY

## 2023-08-17 PROCEDURE — 74018 RADEX ABDOMEN 1 VIEW: CPT

## 2023-08-17 PROCEDURE — A4217 STERILE WATER/SALINE, 500 ML: HCPCS | Performed by: UROLOGY

## 2023-08-17 PROCEDURE — 88360 TUMOR IMMUNOHISTOCHEM/MANUAL: CPT

## 2023-08-17 RX ORDER — PHENYLEPHRINE HCL IN 0.9% NACL 1 MG/10 ML
SYRINGE (ML) INTRAVENOUS PRN
Status: DISCONTINUED | OUTPATIENT
Start: 2023-08-17 | End: 2023-08-17 | Stop reason: SDUPTHER

## 2023-08-17 RX ORDER — SODIUM CHLORIDE 9 MG/ML
INJECTION, SOLUTION INTRAVENOUS PRN
Status: DISCONTINUED | OUTPATIENT
Start: 2023-08-17 | End: 2023-08-17 | Stop reason: HOSPADM

## 2023-08-17 RX ORDER — SODIUM CHLORIDE 0.9 % (FLUSH) 0.9 %
5-40 SYRINGE (ML) INJECTION EVERY 12 HOURS SCHEDULED
Status: DISCONTINUED | OUTPATIENT
Start: 2023-08-17 | End: 2023-08-17 | Stop reason: HOSPADM

## 2023-08-17 RX ORDER — CEFUROXIME AXETIL 250 MG/1
250 TABLET ORAL 2 TIMES DAILY
Qty: 14 TABLET | Refills: 0 | Status: SHIPPED | OUTPATIENT
Start: 2023-08-17 | End: 2023-08-24

## 2023-08-17 RX ORDER — MAGNESIUM HYDROXIDE 1200 MG/15ML
LIQUID ORAL CONTINUOUS PRN
Status: COMPLETED | OUTPATIENT
Start: 2023-08-17 | End: 2023-08-17

## 2023-08-17 RX ORDER — MEPERIDINE HYDROCHLORIDE 25 MG/ML
12.5 INJECTION INTRAMUSCULAR; INTRAVENOUS; SUBCUTANEOUS EVERY 5 MIN PRN
Status: DISCONTINUED | OUTPATIENT
Start: 2023-08-17 | End: 2023-08-17 | Stop reason: HOSPADM

## 2023-08-17 RX ORDER — FENTANYL CITRATE 0.05 MG/ML
25 INJECTION, SOLUTION INTRAMUSCULAR; INTRAVENOUS EVERY 5 MIN PRN
Status: DISCONTINUED | OUTPATIENT
Start: 2023-08-17 | End: 2023-08-17 | Stop reason: HOSPADM

## 2023-08-17 RX ORDER — EPHEDRINE SULFATE/0.9% NACL/PF 50 MG/5 ML
SYRINGE (ML) INTRAVENOUS PRN
Status: DISCONTINUED | OUTPATIENT
Start: 2023-08-17 | End: 2023-08-17 | Stop reason: SDUPTHER

## 2023-08-17 RX ORDER — ONDANSETRON 2 MG/ML
4 INJECTION INTRAMUSCULAR; INTRAVENOUS
Status: DISCONTINUED | OUTPATIENT
Start: 2023-08-17 | End: 2023-08-17 | Stop reason: HOSPADM

## 2023-08-17 RX ORDER — SODIUM CHLORIDE 0.9 % (FLUSH) 0.9 %
5-40 SYRINGE (ML) INJECTION PRN
Status: DISCONTINUED | OUTPATIENT
Start: 2023-08-17 | End: 2023-08-17 | Stop reason: HOSPADM

## 2023-08-17 RX ORDER — OXYCODONE HYDROCHLORIDE AND ACETAMINOPHEN 5; 325 MG/1; MG/1
1 TABLET ORAL
Status: COMPLETED | OUTPATIENT
Start: 2023-08-17 | End: 2023-08-17

## 2023-08-17 RX ORDER — MAGNESIUM HYDROXIDE 1200 MG/15ML
LIQUID ORAL
Status: COMPLETED | OUTPATIENT
Start: 2023-08-17 | End: 2023-08-17

## 2023-08-17 RX ORDER — FENTANYL CITRATE 0.05 MG/ML
50 INJECTION, SOLUTION INTRAMUSCULAR; INTRAVENOUS EVERY 5 MIN PRN
Status: DISCONTINUED | OUTPATIENT
Start: 2023-08-17 | End: 2023-08-17 | Stop reason: HOSPADM

## 2023-08-17 RX ORDER — PROPOFOL 10 MG/ML
INJECTION, EMULSION INTRAVENOUS PRN
Status: DISCONTINUED | OUTPATIENT
Start: 2023-08-17 | End: 2023-08-17 | Stop reason: SDUPTHER

## 2023-08-17 RX ORDER — LIDOCAINE HYDROCHLORIDE 20 MG/ML
INJECTION, SOLUTION EPIDURAL; INFILTRATION; INTRACAUDAL; PERINEURAL PRN
Status: DISCONTINUED | OUTPATIENT
Start: 2023-08-17 | End: 2023-08-17 | Stop reason: SDUPTHER

## 2023-08-17 RX ORDER — FENTANYL CITRATE 50 UG/ML
INJECTION, SOLUTION INTRAMUSCULAR; INTRAVENOUS PRN
Status: DISCONTINUED | OUTPATIENT
Start: 2023-08-17 | End: 2023-08-17 | Stop reason: SDUPTHER

## 2023-08-17 RX ADMIN — PROPOFOL 150 MCG/KG/MIN: 10 INJECTION, EMULSION INTRAVENOUS at 14:24

## 2023-08-17 RX ADMIN — Medication 100 MCG: at 14:53

## 2023-08-17 RX ADMIN — PROPOFOL 50 MG: 10 INJECTION, EMULSION INTRAVENOUS at 14:23

## 2023-08-17 RX ADMIN — CEFTRIAXONE 2000 MG: 2 INJECTION, POWDER, FOR SOLUTION INTRAMUSCULAR; INTRAVENOUS at 14:21

## 2023-08-17 RX ADMIN — Medication 100 MCG: at 14:55

## 2023-08-17 RX ADMIN — Medication 200 MCG: at 14:59

## 2023-08-17 RX ADMIN — LIDOCAINE HYDROCHLORIDE 100 MG: 20 INJECTION, SOLUTION EPIDURAL; INFILTRATION; INTRACAUDAL; PERINEURAL at 14:23

## 2023-08-17 RX ADMIN — FENTANYL CITRATE 50 MCG: 50 INJECTION INTRAMUSCULAR; INTRAVENOUS at 14:23

## 2023-08-17 RX ADMIN — PROPOFOL 40 MG: 10 INJECTION, EMULSION INTRAVENOUS at 14:30

## 2023-08-17 RX ADMIN — Medication 100 MCG: at 14:50

## 2023-08-17 RX ADMIN — OXYCODONE AND ACETAMINOPHEN 1 TABLET: 5; 325 TABLET ORAL at 16:31

## 2023-08-17 RX ADMIN — FENTANYL CITRATE 25 MCG: 0.05 INJECTION, SOLUTION INTRAMUSCULAR; INTRAVENOUS at 15:52

## 2023-08-17 RX ADMIN — SODIUM CHLORIDE: 9 INJECTION, SOLUTION INTRAVENOUS at 13:52

## 2023-08-17 RX ADMIN — FENTANYL CITRATE 50 MCG: 50 INJECTION INTRAMUSCULAR; INTRAVENOUS at 14:30

## 2023-08-17 RX ADMIN — Medication 10 MG: at 15:02

## 2023-08-17 RX ADMIN — FENTANYL CITRATE 25 MCG: 0.05 INJECTION, SOLUTION INTRAMUSCULAR; INTRAVENOUS at 16:01

## 2023-08-17 ASSESSMENT — PAIN - FUNCTIONAL ASSESSMENT
PAIN_FUNCTIONAL_ASSESSMENT: PREVENTS OR INTERFERES SOME ACTIVE ACTIVITIES AND ADLS
PAIN_FUNCTIONAL_ASSESSMENT: 0-10
PAIN_FUNCTIONAL_ASSESSMENT: PREVENTS OR INTERFERES SOME ACTIVE ACTIVITIES AND ADLS

## 2023-08-17 ASSESSMENT — PAIN DESCRIPTION - PAIN TYPE
TYPE: SURGICAL PAIN
TYPE: SURGICAL PAIN

## 2023-08-17 ASSESSMENT — PAIN SCALES - GENERAL
PAINLEVEL_OUTOF10: 4
PAINLEVEL_OUTOF10: 6
PAINLEVEL_OUTOF10: 3
PAINLEVEL_OUTOF10: 4

## 2023-08-17 ASSESSMENT — PAIN DESCRIPTION - DESCRIPTORS
DESCRIPTORS: THROBBING
DESCRIPTORS: THROBBING

## 2023-08-17 ASSESSMENT — PAIN DESCRIPTION - LOCATION
LOCATION: PENIS
LOCATION: PELVIS

## 2023-08-17 ASSESSMENT — PAIN DESCRIPTION - ONSET
ONSET: ON-GOING
ONSET: ON-GOING

## 2023-08-17 ASSESSMENT — PAIN DESCRIPTION - ORIENTATION
ORIENTATION: INNER
ORIENTATION: INNER

## 2023-08-17 ASSESSMENT — PAIN DESCRIPTION - FREQUENCY
FREQUENCY: CONTINUOUS
FREQUENCY: INTERMITTENT

## 2023-08-17 NOTE — ANESTHESIA POSTPROCEDURE EVALUATION
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     Oscar Phase I: Oscar Score: 10    Oscar Phase II:        Anesthesia Post Evaluation    Patient location during evaluation: PACU  Patient participation: complete - patient participated  Level of consciousness: awake and alert  Pain score: 0  Airway patency: patent  Nausea & Vomiting: no nausea and no vomiting  Complications: no  Cardiovascular status: blood pressure returned to baseline  Respiratory status: acceptable  Hydration status: euvolemic  Pain management: adequate

## 2023-08-17 NOTE — DISCHARGE INSTRUCTIONS
Drain romeo bag as needed. Call for severe pain, fever, or lack of drainage from romeo bag.   Some hematuria is to be expected  My office will call him to arrange follow up next week

## 2023-08-17 NOTE — PROGRESS NOTES
Pt is alert and oriented with stable vital signs on room air. Medicated for pain with PO pain medication, see MAR. Discharge instructions given to Pt and spouse, all questions answered. Pt and spouse educated on how to empty romeo leg bag. Pt discharged to home with spouse to transport.

## 2023-08-17 NOTE — ANESTHESIA PRE PROCEDURE
chart reviewed (including anesthesia, drug and allergy history). No interval changes to history and physical examination. Anesthetic plan, risks, benefits, alternatives, and personnel involved discussed with patient. Patient verbalized an understanding and agrees to proceed.       Fely Sierra MD  August 17, 2023  2:06 PM

## 2023-08-17 NOTE — INTERVAL H&P NOTE
Update History & Physical    The patient's History and Physical was reviewed with the patient and I examined the patient. There was no change. The surgical site was confirmed by the patient and me. Plan: The risks, benefits, expected outcome, and alternative to the recommended procedure have been discussed with the patient. Patient understands and wants to proceed with the procedure.      Electronically signed by Indigo Eckert MD on 8/17/2023 at 2:24 PM

## 2023-08-17 NOTE — BRIEF OP NOTE
Brief Postoperative Note      Patient: Gwendolyn Betancur  YOB: 1955  MRN: [id number]    Date of Procedure: 8/17/2023    Pre op diagnosis:  right hydronephrosis    Post-Op Diagnosis: right hydronephrosis due to reflux, bladder outlet obstruction       Procedure(s):  CYSTOSCOPY RIGHT DIAGNOSTIC URETEROSCOPY  RIGHT STENT REMOVAL; right retrograde pyelogram, bladder biopsy and fulguration, romeo catheter insertion    Surgeon(s):  Sumeet Rea MD    Assistant:  Surgical Assistant: Katie Olivares    Anesthesia: General    Estimated Blood Loss (mL): Minimal    Complications: None    Specimens:   ID Type Source Tests Collected by Time Destination   A : A) Bladder biopsy Tissue Tissue SURGICAL PATHOLOGY Sumeet Rea MD 8/17/2023 1454        Implants:  * No implants in log *      Drains: * No LDAs found *    Findings: 1.  abnormal, trabeculated bladder  2. Mild BPH with high bladder neck  3. Right hydro down to bladder with no stricture or obstructing lesion consistent with reflux  4.   Abnormal trabeculated tissue at bladder base was biopsied      Electronically signed by Sumeet Rea MD on 8/17/2023 at 3:08 PM

## 2023-08-18 NOTE — OP NOTE
[address], [address] [place] [facility]                                OPERATIVE REPORT    PATIENT NAME: Elder Diaz                    :        1955  MED REC NO:   [id number]                          ROOM:  ACCOUNT NO:   [de-identified]                           ADMIT DATE: 2023  PROVIDER:     Kristina Ordaz MD    DATE OF PROCEDURE:  2023    PREOPERATIVE DIAGNOSIS:  Right hydronephrosis. POSTOPERATIVE DIAGNOSES:  1. Right hydronephrosis secondary to reflux. 2.  Bladder outlet obstruction. 3.  Abnormal bladder tissue near the trigone. PROCEDURE:  Cystoscopy, right retrograde pyelogram, right ureteroscopy,  right stent removal, bladder biopsy and fulguration, Oquendo catheter  placement. SURGEON:  Kristina Ordaz MD    ANESTHESIA:  General.    COMPLICATIONS:  None. BLOOD LOSS:  Minimal.    SPECIMEN:  Bladder biopsy sent to Pathology. INDICATIONS:  A 69year-old gentleman initially presented with right  flank pain. A CT scan showed hydronephrosis all the way down to the  bladder, with concern for distal ureteral stricture. He was treated for  an impacted right distal ureteral calculus in 2019. The concern was  that he had a stricture at the site of the previous impacted stone. He  underwent cystoscopy with attempted retrograde pyelogram several weeks  ago. The procedure was unsuccessful as I was unable to identify the  right ureteral orifice due to severely trabeculated abnormal bladder. At that time, he was sent to Interventional Radiology for a percutaneous  nephrostomy tube placement and then subsequently had an antegrade stent  inserted. He is here now for repeat cystoscopy with right sided  ureteroscopy to determine the cause of the hydronephrosis. DETAILS OF THE PROCEDURE:  He is given Rocephin IV and taken to the  cystoscopy suite. He moved onto the table.   Anesthesia was induced. His position was changed to the lithotomy position. His genitalia were  prepped and draped. The 21-Sol[place] Islander cystoscope advanced easily through  the urethra into the bladder. There were no urethral strictures. The  prostate was only mildly enlarged, but the lateral lobes did meet in the  midline and there was a high bladder neck. The bladder itself was  severely trabeculated. The right ureteral orifice was identified with a  stent extending from the ureter. The stent was grasped with a grasper  and pulled to the meatus. A 0.035 ZIPwire was advanced through the  stent up to the kidney under fluoroscopic guidance. The stent was  withdrawn leaving the wire indwelling. A Pollack catheter was advanced  over the wire and then the wire was withdrawn. A retrograde pyelogram  was performed, which showed massive dilation of the entire right  collecting system. I then re-advanced the wire through the Karol and  removed the Karol. I then advanced rigid ureteroscope alongside the  wire into the bladder and then into the ureter. The ureteroscope passed  easily through the ureter all the way up to the massively dilated renal  pelvis. There were no obstructing lesions. At this point, it became  clear that the hydronephrosis was likely secondary to reflux. The  ureteroscope was withdrawn and the wire was removed. I then reinserted  the cystoscope and used a cold cup biopsy forceps to biopsy some of the  inflammatory tissue, which was located near the trigone making  visualization of the ureters difficult. The biopsy was passed off the  field. Unfortunately, we did not have a Bugbee electrode. Therefore, I  switched out to a resectoscope and used the rollerball to fulgurate the  biopsy site. At the end of the procedure, there was no active bleeding. The resectoscope was withdrawn. I elected to leave a Oquendo catheter. An 18-Sol[place] Islander catheter was inserted and the balloon was filled with  water.   The

## 2023-11-01 ENCOUNTER — OFFICE VISIT (OUTPATIENT)
Dept: CARDIOLOGY CLINIC | Age: 68
End: 2023-11-01
Payer: MEDICARE

## 2023-11-01 VITALS
OXYGEN SATURATION: 95 % | BODY MASS INDEX: 29.78 KG/M2 | WEIGHT: 208 LBS | HEART RATE: 66 BPM | SYSTOLIC BLOOD PRESSURE: 128 MMHG | HEIGHT: 70 IN | DIASTOLIC BLOOD PRESSURE: 74 MMHG

## 2023-11-01 DIAGNOSIS — I25.10 CAD IN NATIVE ARTERY: Primary | ICD-10-CM

## 2023-11-01 PROCEDURE — 3078F DIAST BP <80 MM HG: CPT | Performed by: INTERNAL MEDICINE

## 2023-11-01 PROCEDURE — 1036F TOBACCO NON-USER: CPT | Performed by: INTERNAL MEDICINE

## 2023-11-01 PROCEDURE — G8419 CALC BMI OUT NRM PARAM NOF/U: HCPCS | Performed by: INTERNAL MEDICINE

## 2023-11-01 PROCEDURE — 99214 OFFICE O/P EST MOD 30 MIN: CPT | Performed by: INTERNAL MEDICINE

## 2023-11-01 PROCEDURE — G8484 FLU IMMUNIZE NO ADMIN: HCPCS | Performed by: INTERNAL MEDICINE

## 2023-11-01 PROCEDURE — G8427 DOCREV CUR MEDS BY ELIG CLIN: HCPCS | Performed by: INTERNAL MEDICINE

## 2023-11-01 PROCEDURE — 3017F COLORECTAL CA SCREEN DOC REV: CPT | Performed by: INTERNAL MEDICINE

## 2023-11-01 PROCEDURE — 3074F SYST BP LT 130 MM HG: CPT | Performed by: INTERNAL MEDICINE

## 2023-11-01 PROCEDURE — 1123F ACP DISCUSS/DSCN MKR DOCD: CPT | Performed by: INTERNAL MEDICINE

## 2023-11-01 NOTE — PROGRESS NOTES
production. No hematemesis. Gastrointestinal: No abdominal pain, appetite loss, blood in stools. No change in bowel or bladder habits. Genitourinary: No dysuria, trouble voiding, or hematuria. Musculoskeletal:  No gait disturbance, weakness or joint complaints. Integumentary: No rash or pruritis. Neurological: No headache, diplopia, change in muscle strength, numbness or tingling. No change in gait, balance, coordination, mood, affect, memory, mentation, behavior. Psychiatric: No anxiety, no depression. Endocrine: No malaise, fatigue or temperature intolerance. No excessive thirst, fluid intake, or urination. No tremor. Hematologic/Lymphatic: No abnormal bruising or bleeding, blood clots or swollen lymph nodes. Allergic/Immunologic: No nasal congestion or hives. Physical Examination:    Vitals:    11/01/23 1400   BP: 128/74   Pulse: 66   SpO2: 95%            Constitutional and General Appearance: NAD   Respiratory:  Normal excursion and expansion without use of accessory muscles  Resp Auscultation: Normal breath sounds without dullness  Cardiovascular: The apical impulses not displaced  Heart tones are crisp and normal  Cervical veins are not engorged  The carotid upstroke is normal in amplitude and contour without delay or bruit  Normal S1S2, No S3, No Murmur  Peripheral pulses are symmetrical and full  There is no clubbing, cyanosis of the extremities. No edema  Femoral Arteries: 2+ and equal  Pedal Pulses: 2+ and equal   Abdomen:  No masses or tenderness  Liver/Spleen: No Abnormalities Noted  Neurological/Psychiatric:  Alert and oriented in all spheres  Moves all extremities well  Exhibits normal gait balance and coordination  No abnormalities of mood, affect, memory, mentation, or behavior are noted    Stress echo 4/16/2014  Normal  Plain GXT 4/2016  Normal    GXT 4/2018   Normal (Negative) response to exercise.    GXT Negative for ischemia   Frequent PVC's at rest. Frequency of PVC's decreased

## 2023-11-01 NOTE — PATIENT INSTRUCTIONS
Plan:  Cardiac medications reviewed including indications and pertinent side effects. Medication list updated at this visit. Check blood pressure and heart rate at home a few times per week- keep a log with dates and times and bring to office visit   Regular exercise and following a healthy diet encouraged   Follow up with me in 1 year.

## 2024-05-13 RX ORDER — TAMSULOSIN HYDROCHLORIDE 0.4 MG/1
0.4 CAPSULE ORAL DAILY
COMMUNITY

## 2024-05-13 NOTE — PROGRESS NOTES
[facility] PRE-OPERATIVE INSTRUCTIONS     Arrival time:  0640 Surgery time:0810      Take the following medications with a sip of water:  Follow your MD/Surgeons pre-procedure instructions regarding your medications     Do not eat or drink anything after 12:00 midnight prior to your surgery.  This includes water chewing gum, mints and ice chips.   You may brush your teeth and gargle the morning of your surgery, but do not swallow the water     Please see your family doctor/pediatrician for a history and physical and/or concerning medications.   Bring any test results/reports from your physicians office.   If you are under the care of a heart doctor or specialist doctor, please be aware that you may be asked to them for clearance    You may be asked to stop blood thinners such as Coumadin, Plavix, Fragmin, Lovenox, etc., or any anti-inflammatories such as:  Aspirin, Ibuprofen, Advil, Naproxen prior to your surgery.    We also ask that you stop any OTC medications such as fish oil, vitamin E, glucosamine, garlic, Multivitamins, COQ 10, etc.    We ask that you do not smoke 24 hours prior to surgery  We ask that you do not  drink any alcoholic beverages 24 hours prior to surgery     You must make arrangements for a responsible adult to take you home after your surgery.    For your safety you will not be allowed to leave alone or drive yourself home.  Your surgery will be cancelled if you do not have a ride home.     Also for your safety, it is strongly suggested that someone stay with you the first 24 hours after your surgery.     A parent or legal guardian must accompany a child scheduled for surgery and plan to stay at the hospital until the child is discharged.    Please do not bring other children with you.    For your comfort, please wear simple loose fitting clothing to the hospital.  Please do not bring valuables.    Do not wear any make-up or nail polish on your fingers or toes      For your safety,  please do not wear any jewelry or body piercing's on the day of surgery.   All jewelry must be removed.      If you have dentures, they will be removed before going to operating room.    For your convenience, we will provide you with a container.    If you wear contact lenses or glasses, they will be removed, please bring a case for them.     If you have a living will and a durable power of  for healthcare, please bring in a copy.     As part of our patient safety program to minimize surgical site infections, we ask you to do the following:    Please notify your surgeon if you develop any illness between         now and the  day of your surgery.    This includes a cough, cold, fever, sore throat, nausea,         or vomiting, and diarrhea, etc.   Please notify your surgeon if you experience dizziness, shortness         of breath or blurred vision between now and the time of your surgery.      Do not shave your operative site 96 hours prior to surgery.   For face and neck surgery, men may use an electric razor 48 hours   prior to surgery.    You may shower the night before surgery or the morning of   your surgery with an antibacterial soap.    You will need to bring a photo ID and insurance card    [facility] has an onsite pharmacy, would you like to utilize our pharmacy     If you will be staying overnight and use a C-pap machine, please bring   your C-pap to hospital     Our goal is to provide you with excellent care, therefore, visitors will be limited to two(2) in the room at a time so that we may focus on providing this care for you.          Please contact pre-admission testing if you have any further questions.                 Anita Phan phone number:  [phone]     [facility] fax number:  [phone]  Please note these are generalized instructions for all surgical cases, you may be provided with more specific instructions according to your surgery.    C-Difficile admission screening and protocol:

## 2024-05-16 ENCOUNTER — ANESTHESIA EVENT (OUTPATIENT)
Dept: SURGERY | Age: 69
End: 2024-05-16
Payer: MEDICARE

## 2024-05-16 RX ORDER — SODIUM CHLORIDE 0.9 % (FLUSH) 0.9 %
5-40 SYRINGE (ML) INJECTION EVERY 12 HOURS SCHEDULED
Status: CANCELLED | OUTPATIENT
Start: 2024-05-16

## 2024-05-16 RX ORDER — SODIUM CHLORIDE 0.9 % (FLUSH) 0.9 %
5-40 SYRINGE (ML) INJECTION PRN
Status: CANCELLED | OUTPATIENT
Start: 2024-05-16

## 2024-05-16 RX ORDER — SODIUM CHLORIDE 9 MG/ML
INJECTION, SOLUTION INTRAVENOUS PRN
Status: CANCELLED | OUTPATIENT
Start: 2024-05-16

## 2024-05-16 NOTE — PERIOP NOTE
[address]  [phone]        Pre-Op Phone Call:     Patient Name: Jose Ragsdale     Telephone Information:   Mobile [phone]     Home phone:  [phone]    Surgery Time:    8:10 AM     Arrival Time:  6:15     Left extended Message:  Yes     Message left with: Spoke with patient      Recent change in health status:  No     Advised of transportation/ policy:  Yes     NPO policy reviewed:  Yes     Advised to take morning heart/blood pressure medications with sips of water morning of surgery?  Yes     Instructed to bring eye drops, photo identification, and insurance card day of surgery?  Yes     Advised to wear short sleeved button down shirt (no T-shirt underneath):  Yes     Advised not to wear jewelry, hairpins, or pantyhose day of surgery?  Yes     Advised not to wear make-up and to wash face day of surgery?  Yes    Remarks: Spoke with patient         Electronically signed by:  Cyndie Olmos RN at 5/16/2024 8:34 AM

## 2024-05-17 ENCOUNTER — HOSPITAL ENCOUNTER (OUTPATIENT)
Age: 69
Setting detail: OUTPATIENT SURGERY
Discharge: HOME | End: 2024-05-17
Attending: OPHTHALMOLOGY | Admitting: OPHTHALMOLOGY
Payer: MEDICARE

## 2024-05-17 ENCOUNTER — ANESTHESIA (OUTPATIENT)
Dept: SURGERY | Age: 69
End: 2024-05-17
Payer: MEDICARE

## 2024-05-17 ENCOUNTER — PREP FOR PROCEDURE (OUTPATIENT)
Dept: OPHTHALMOLOGY | Age: 69
End: 2024-05-17

## 2024-05-17 VITALS
HEIGHT: 70 IN | SYSTOLIC BLOOD PRESSURE: 130 MMHG | RESPIRATION RATE: 22 BRPM | HEART RATE: 66 BPM | OXYGEN SATURATION: 96 % | TEMPERATURE: 98 F | WEIGHT: 215 LBS | BODY MASS INDEX: 30.78 KG/M2 | DIASTOLIC BLOOD PRESSURE: 87 MMHG

## 2024-05-17 PROBLEM — H25.12 NUCLEAR SCLEROTIC CATARACT OF LEFT EYE: Status: RESOLVED | Noted: 2024-05-17 | Resolved: 2024-05-17

## 2024-05-17 PROBLEM — H25.12 NUCLEAR SCLEROTIC CATARACT OF LEFT EYE: Status: ACTIVE | Noted: 2024-05-17

## 2024-05-17 PROCEDURE — 2500000003 HC RX 250 WO HCPCS: Performed by: OPHTHALMOLOGY

## 2024-05-17 PROCEDURE — 6360000002 HC RX W HCPCS: Performed by: NURSE ANESTHETIST, CERTIFIED REGISTERED

## 2024-05-17 PROCEDURE — V2632 POST CHMBR INTRAOCULAR LENS: HCPCS | Performed by: OPHTHALMOLOGY

## 2024-05-17 PROCEDURE — 3700000000 HC ANESTHESIA ATTENDED CARE: Performed by: OPHTHALMOLOGY

## 2024-05-17 PROCEDURE — 2580000003 HC RX 258: Performed by: NURSE ANESTHETIST, CERTIFIED REGISTERED

## 2024-05-17 PROCEDURE — 2720000010 HC SURG SUPPLY STERILE: Performed by: OPHTHALMOLOGY

## 2024-05-17 PROCEDURE — 3600000014 HC SURGERY LEVEL 4 ADDTL 15MIN: Performed by: OPHTHALMOLOGY

## 2024-05-17 PROCEDURE — 2709999900 HC NON-CHARGEABLE SUPPLY: Performed by: OPHTHALMOLOGY

## 2024-05-17 PROCEDURE — 6370000000 HC RX 637 (ALT 250 FOR IP): Performed by: OPHTHALMOLOGY

## 2024-05-17 PROCEDURE — 2580000003 HC RX 258: Performed by: STUDENT IN AN ORGANIZED HEALTH CARE EDUCATION/TRAINING PROGRAM

## 2024-05-17 PROCEDURE — 7100000010 HC PHASE II RECOVERY - FIRST 15 MIN: Performed by: OPHTHALMOLOGY

## 2024-05-17 PROCEDURE — 3600000004 HC SURGERY LEVEL 4 BASE: Performed by: OPHTHALMOLOGY

## 2024-05-17 PROCEDURE — 6360000002 HC RX W HCPCS: Performed by: OPHTHALMOLOGY

## 2024-05-17 PROCEDURE — 3700000001 HC ADD 15 MINUTES (ANESTHESIA): Performed by: OPHTHALMOLOGY

## 2024-05-17 DEVICE — STERILE UV AND BLUE LIGHT FILTERING ACRYLIC FOLDABLE ASPHERIC POSTERIOR CHAMBER INTRAOCULAR LENS
Type: IMPLANTABLE DEVICE | Site: EYE | Status: FUNCTIONAL
Brand: CLAREON

## 2024-05-17 RX ORDER — SODIUM CHLORIDE 0.9 % (FLUSH) 0.9 %
5-40 SYRINGE (ML) INJECTION PRN
Status: CANCELLED | OUTPATIENT
Start: 2024-05-17

## 2024-05-17 RX ORDER — CIPROFLOXACIN HYDROCHLORIDE 3.5 MG/ML
SOLUTION/ DROPS TOPICAL
Status: COMPLETED | OUTPATIENT
Start: 2024-05-17 | End: 2024-05-17

## 2024-05-17 RX ORDER — SODIUM CHLORIDE 9 MG/ML
INJECTION, SOLUTION INTRAVENOUS PRN
Status: DISCONTINUED | OUTPATIENT
Start: 2024-05-17 | End: 2024-05-17 | Stop reason: HOSPADM

## 2024-05-17 RX ORDER — PREDNISOLONE ACETATE 10 MG/ML
SUSPENSION/ DROPS OPHTHALMIC
Status: COMPLETED | OUTPATIENT
Start: 2024-05-17 | End: 2024-05-17

## 2024-05-17 RX ORDER — SODIUM CHLORIDE 0.9 % (FLUSH) 0.9 %
5-40 SYRINGE (ML) INJECTION EVERY 12 HOURS SCHEDULED
Status: DISCONTINUED | OUTPATIENT
Start: 2024-05-17 | End: 2024-05-17 | Stop reason: SDUPTHER

## 2024-05-17 RX ORDER — CIPROFLOXACIN HYDROCHLORIDE 3.5 MG/ML
1 SOLUTION/ DROPS TOPICAL SEE ADMIN INSTRUCTIONS
Status: COMPLETED | OUTPATIENT
Start: 2024-05-17 | End: 2024-05-17

## 2024-05-17 RX ORDER — MIDAZOLAM HYDROCHLORIDE 1 MG/ML
INJECTION INTRAMUSCULAR; INTRAVENOUS PRN
Status: DISCONTINUED | OUTPATIENT
Start: 2024-05-17 | End: 2024-05-17 | Stop reason: SDUPTHER

## 2024-05-17 RX ORDER — TROPICAMIDE 10 MG/ML
1 SOLUTION/ DROPS OPHTHALMIC SEE ADMIN INSTRUCTIONS
Status: CANCELLED | OUTPATIENT
Start: 2024-05-17

## 2024-05-17 RX ORDER — SODIUM CHLORIDE 9 MG/ML
INJECTION, SOLUTION INTRAVENOUS CONTINUOUS PRN
Status: DISCONTINUED | OUTPATIENT
Start: 2024-05-17 | End: 2024-05-17 | Stop reason: SDUPTHER

## 2024-05-17 RX ORDER — KETOROLAC TROMETHAMINE 5 MG/ML
1 SOLUTION OPHTHALMIC SEE ADMIN INSTRUCTIONS
Status: COMPLETED | OUTPATIENT
Start: 2024-05-17 | End: 2024-05-17

## 2024-05-17 RX ORDER — PHENYLEPHRINE HYDROCHLORIDE 25 MG/ML
1 SOLUTION/ DROPS OPHTHALMIC SEE ADMIN INSTRUCTIONS
Status: COMPLETED | OUTPATIENT
Start: 2024-05-17 | End: 2024-05-17

## 2024-05-17 RX ORDER — TROPICAMIDE 10 MG/ML
1 SOLUTION/ DROPS OPHTHALMIC SEE ADMIN INSTRUCTIONS
Status: COMPLETED | OUTPATIENT
Start: 2024-05-17 | End: 2024-05-17

## 2024-05-17 RX ORDER — SODIUM CHLORIDE 9 MG/ML
INJECTION, SOLUTION INTRAVENOUS PRN
Status: CANCELLED | OUTPATIENT
Start: 2024-05-17

## 2024-05-17 RX ORDER — TETRACAINE HYDROCHLORIDE 5 MG/ML
SOLUTION OPHTHALMIC
Status: COMPLETED | OUTPATIENT
Start: 2024-05-17 | End: 2024-05-17

## 2024-05-17 RX ORDER — SODIUM CHLORIDE 0.9 % (FLUSH) 0.9 %
5-40 SYRINGE (ML) INJECTION PRN
Status: DISCONTINUED | OUTPATIENT
Start: 2024-05-17 | End: 2024-05-17 | Stop reason: SDUPTHER

## 2024-05-17 RX ORDER — BRIMONIDINE TARTRATE 1.5 MG/ML
SOLUTION/ DROPS OPHTHALMIC
Status: COMPLETED | OUTPATIENT
Start: 2024-05-17 | End: 2024-05-17

## 2024-05-17 RX ORDER — SODIUM CHLORIDE 0.9 % (FLUSH) 0.9 %
5-40 SYRINGE (ML) INJECTION EVERY 12 HOURS SCHEDULED
Status: DISCONTINUED | OUTPATIENT
Start: 2024-05-17 | End: 2024-05-17 | Stop reason: HOSPADM

## 2024-05-17 RX ORDER — SODIUM CHLORIDE 9 MG/ML
INJECTION, SOLUTION INTRAVENOUS PRN
Status: DISCONTINUED | OUTPATIENT
Start: 2024-05-17 | End: 2024-05-17 | Stop reason: SDUPTHER

## 2024-05-17 RX ORDER — NALOXONE HYDROCHLORIDE 0.4 MG/ML
INJECTION, SOLUTION INTRAMUSCULAR; INTRAVENOUS; SUBCUTANEOUS PRN
Status: CANCELLED | OUTPATIENT
Start: 2024-05-17

## 2024-05-17 RX ORDER — KETOROLAC TROMETHAMINE 5 MG/ML
1 SOLUTION OPHTHALMIC SEE ADMIN INSTRUCTIONS
Status: CANCELLED | OUTPATIENT
Start: 2024-05-17

## 2024-05-17 RX ORDER — FENTANYL CITRATE 50 UG/ML
INJECTION, SOLUTION INTRAMUSCULAR; INTRAVENOUS PRN
Status: DISCONTINUED | OUTPATIENT
Start: 2024-05-17 | End: 2024-05-17 | Stop reason: SDUPTHER

## 2024-05-17 RX ORDER — SODIUM CHLORIDE 0.9 % (FLUSH) 0.9 %
5-40 SYRINGE (ML) INJECTION PRN
Status: DISCONTINUED | OUTPATIENT
Start: 2024-05-17 | End: 2024-05-17 | Stop reason: HOSPADM

## 2024-05-17 RX ORDER — PHENYLEPHRINE HYDROCHLORIDE 25 MG/ML
1 SOLUTION/ DROPS OPHTHALMIC SEE ADMIN INSTRUCTIONS
Status: CANCELLED | OUTPATIENT
Start: 2024-05-17

## 2024-05-17 RX ORDER — KETOROLAC TROMETHAMINE 5 MG/ML
SOLUTION OPHTHALMIC
Status: COMPLETED | OUTPATIENT
Start: 2024-05-17 | End: 2024-05-17

## 2024-05-17 RX ORDER — SODIUM CHLORIDE 0.9 % (FLUSH) 0.9 %
5-40 SYRINGE (ML) INJECTION EVERY 12 HOURS SCHEDULED
Status: CANCELLED | OUTPATIENT
Start: 2024-05-17

## 2024-05-17 RX ADMIN — MIDAZOLAM 1 MG: 1 INJECTION INTRAMUSCULAR; INTRAVENOUS at 08:30

## 2024-05-17 RX ADMIN — PHENYLEPHRINE HYDROCHLORIDE 1 DROP: 25 SOLUTION/ DROPS OPHTHALMIC at 07:27

## 2024-05-17 RX ADMIN — KETOROLAC TROMETHAMINE 1 DROP: 0.5 SOLUTION OPHTHALMIC at 07:21

## 2024-05-17 RX ADMIN — FENTANYL CITRATE 25 MCG: 50 INJECTION INTRAMUSCULAR; INTRAVENOUS at 08:14

## 2024-05-17 RX ADMIN — PHENYLEPHRINE HYDROCHLORIDE 1 DROP: 25 SOLUTION/ DROPS OPHTHALMIC at 07:16

## 2024-05-17 RX ADMIN — PHENYLEPHRINE HYDROCHLORIDE 1 DROP: 25 SOLUTION/ DROPS OPHTHALMIC at 07:21

## 2024-05-17 RX ADMIN — CIPROFLOXACIN 1 DROP: 3 SOLUTION OPHTHALMIC at 07:33

## 2024-05-17 RX ADMIN — SODIUM CHLORIDE: 9 INJECTION, SOLUTION INTRAVENOUS at 08:07

## 2024-05-17 RX ADMIN — SODIUM CHLORIDE: 9 INJECTION, SOLUTION INTRAVENOUS at 07:30

## 2024-05-17 RX ADMIN — CIPROFLOXACIN 1 DROP: 3 SOLUTION OPHTHALMIC at 07:39

## 2024-05-17 RX ADMIN — KETOROLAC TROMETHAMINE 1 DROP: 0.5 SOLUTION OPHTHALMIC at 07:27

## 2024-05-17 RX ADMIN — MIDAZOLAM 1 MG: 1 INJECTION INTRAMUSCULAR; INTRAVENOUS at 08:10

## 2024-05-17 RX ADMIN — TROPICAMIDE 1 DROP: 10 SOLUTION/ DROPS OPHTHALMIC at 07:21

## 2024-05-17 RX ADMIN — TROPICAMIDE 1 DROP: 10 SOLUTION/ DROPS OPHTHALMIC at 07:16

## 2024-05-17 RX ADMIN — FENTANYL CITRATE 25 MCG: 50 INJECTION INTRAMUSCULAR; INTRAVENOUS at 08:11

## 2024-05-17 RX ADMIN — CIPROFLOXACIN 1 DROP: 3 SOLUTION OPHTHALMIC at 07:28

## 2024-05-17 RX ADMIN — KETOROLAC TROMETHAMINE 1 DROP: 0.5 SOLUTION OPHTHALMIC at 07:16

## 2024-05-17 RX ADMIN — TROPICAMIDE 1 DROP: 10 SOLUTION/ DROPS OPHTHALMIC at 07:27

## 2024-05-17 ASSESSMENT — PAIN - FUNCTIONAL ASSESSMENT
PAIN_FUNCTIONAL_ASSESSMENT: 0-10

## 2024-05-17 NOTE — DISCHARGE INSTRUCTIONS
[facility]   [address], [place] [address]   [phone]       Post-Operative Instructions for Day of Cataract Surgery    @ED@    Patient Name: Jose Ragsdale  : 1955  MRN: [id number]    Use the Antibiotic Drop, Ofloxacin,  one drop in the Left Eye THREE  more times today then FOUR times daily beginning tomorrow. .  Use the non-steroidal anti-inflammatory drop, Ketoralac  one drop in the Left eye THREE  more times today then FOUR times daily beginning tomorrow.  Use the steroid drop, Durezol,  one drop in the Left Eye THREE more times today then FOUR times daily beginning tomorrow.  You may watch TV, walk, and do routine chores. Avoid heavy exertion or straining.  Wear shield over operative eye at bedtime for ONE week.  You may take Tylenol for mild irritation or pain. If you have pain worse than what Tylenol can manage, call your physician  Your next appointment is tomorrow  at the  location. Remember to bring your eye medications and eye drop instruction sheet to each appoinment.         General Post-Operative Instructions for Cataract Surgery    COMFORT - Your eye may feel irritated (scratchy, stinging, or achy) this is normal.   DIET - You may resume your regular diet, no alcohol for 24 hours.  ACTIVITY - Do not lift anything over 5 pounds today for the first week. No driving for 24 hours.  EYE CARE - Use your eye drops as instructed. Wash your hands before using your eye drops. Do not bump, rub, or touch the operative eye. No water in or around your eyes, or showering for 24 hours.  SHIELD - Wear the eye shield for 7 nights following surgery.  VISION - You may experience off/on blurry vision today. Your vision will steadily improve over the next days. Call your surgeon if you experience a significant decrease in your vision. Be careful when walking, especially with steps. You may experience double vision until the anesthetic fully wears off.    Bring your eye

## 2024-05-17 NOTE — ANESTHESIA PRE PROCEDURE
89.1 kg (196 lb 6.4 oz)     Body mass index is 30.85 kg/m².    CBC:   Lab Results   Component Value Date/Time    WBC 10.5 11/17/2018 03:53 PM    RBC 4.65 11/17/2018 03:53 PM    HGB 14.3 11/17/2018 03:53 PM    HCT 42.2 11/17/2018 03:53 PM    MCV 90.7 11/17/2018 03:53 PM    RDW 14.4 11/17/2018 03:53 PM     11/17/2018 03:53 PM       CMP:   Lab Results   Component Value Date/Time     08/17/2023 03:45 PM    K 4.0 08/17/2023 03:45 PM    K 3.9 11/17/2018 03:53 PM     08/17/2023 03:45 PM    CO2 24 08/17/2023 03:45 PM    BUN 18 08/17/2023 03:45 PM    CREATININE 1.1 08/17/2023 03:45 PM    GFRAA >60 11/17/2018 03:53 PM    GFRAA >60 04/18/2012 08:56 AM    AGRATIO 1.1 11/17/2018 03:53 PM    LABGLOM >60 08/17/2023 03:45 PM    GLUCOSE 88 08/17/2023 03:45 PM    CALCIUM 9.0 08/17/2023 03:45 PM    BILITOT 1.2 11/17/2018 03:53 PM    ALKPHOS 73 11/17/2018 03:53 PM    AST 25 11/17/2018 03:53 PM    ALT 31 11/17/2018 03:53 PM       POC Tests: No results for input(s): \"POCGLU\", \"POCNA\", \"POCK\", \"POCCL\", \"POCBUN\", \"POCHEMO\", \"POCHCT\" in the last 72 hours.    Coags: No results found for: \"PROTIME\", \"INR\", \"APTT\"    HCG (If Applicable): No results found for: \"PREGTESTUR\", \"PREGSERUM\", \"HCG\", \"HCGQUANT\"     ABGs: No results found for: \"PHART\", \"PO2ART\", \"HXS6ZVQ\", \"LKP5HAC\", \"BEART\", \"Q8JADDMC\"     Type & Screen (If Applicable):  No results found for: \"LABABO\"    Drug/Infectious Status (If Applicable):  No results found for: \"HIV\", \"HEPCAB\"    COVID-19 Screening (If Applicable): No results found for: \"COVID19\"        Anesthesia Evaluation     no history of anesthetic complications:   Airway: Mallampati: II  TM distance: >3 FB     Mouth opening: > = 3 FB   Dental:      Comment: Denies loose teeth    Pulmonary:       (-) COPD, asthma, rhonchi, wheezes and rales  Smoker: former.                         Cardiovascular:  Exercise tolerance: no interval change  (+) hypertension:, past MI: > 6 months, CAD:, CABG/stent:,

## 2024-05-17 NOTE — OP NOTE
Operative Note          [facility]  [address]  [address]   [place] [address]  [phone]      5/17/2024    Patient name: Jose Ragsdale  YOB: 1955  MRN: [id number]    Alleriges:   Allergies   Allergen Reactions    Ondansetron Itching    Simvastatin     Statins [Statins] Hives       Pre-operative diagnosis:  Cataract Left Eye    Post-operative diagnosis:  Same plus floppy iris syndrome, left eye    Procedure Performed:  Phacoemulsification with insertion of a foldable posterior chamber intraocular lens implant and use of iris retractors,  left eye.    Anesthesia:  Topical Anesthesia with MAC.     Estimated Blood Loss: None    Specimens removed: None    Limbal Relaxing Incisions:  Axis:N/A    Arc Length: N/A    Toric Axis: N/A    Complications:  None    Description of Procedure: In the same day surgery center, the patient was given the usual pre-operative regimen.  In the operating room suite, the left eye was prepped and draped in the usual sterile fashion.  A paracentesis tract was fashioned, after which 0.5 MI of 1% preservative free Lidocaine was injected into the anterior chamber. Trypan blue dye was instilled into the anterior chamber. The anterior chamber was irrigated with balanced salt solution. Viscoat was instilled into the anterior chamber for a complete chamber fill. A crescent blade was used to develop a clear corneal tunnel a clear cornea temporal tunnel, and the anterior chamber was entered using a keratome. Under optimal magnification and visualization, a continuous curvilinear capsulorrhexis was performed.  Hydro-dissection of the lens was carried out using balanced salt solution. At this point the pupil became floppy and small. 6 iris retractors were placed. The lens was then phacoemulsified using the divide and conquer technique. Residual cortex was removed using the I/A handpiece followed by thorough posterior capsule polishing. The anterior  chamber and capsular bag were then filled with Provisc. The lens was gently delivered into the capsular bag and was noted to be well centered. Provisc was removed using the I/A handpiece and the globe was pressurized using balanced salt solution. The 6 iris retractors were removed. A single 10-0 nylon suture was placed in the wound. The knot was buried. The paracentesis tract and the temporal wound were both checked and found to be tight and secure. The speculum was removed. The post-operative drops were instilled. The patient tolerated the procedure well and was taken to the same day surgery suite in stable condition. Post-operative instructions and follow-up care were arranged.       Implants:  Implant Name Type Inv. Item Serial No.  Lot No. LRB No. Used Action   LENS CLAREON IOL 22.0D - [id number]  LENS CLAREON IOL 22.0D [id number] [employer]  Left 1 Implanted           Electronically signed by Geoffrey Hassan MD on 5/17/2024 at 9:14 AM

## 2024-05-17 NOTE — H&P
Date of Surgery Update:  Jose Ragsdale was seen, history and physical examination reviewed, and patient examined by me today. There have been no significant clinical changes since the completion of the previous history and physical.    Electronically signed by: Geoffrey Hassan MD,5/17/2024,8:01 AM

## 2024-05-17 NOTE — ANESTHESIA POSTPROCEDURE EVALUATION
Department of Anesthesiology  Postprocedure Note    Patient: Jose Ragsdale  MRN: [id number]  YOB: 1955  Date of evaluation: 5/17/2024    Procedure Summary       Date: 05/17/24 Room / Location: [address]    Anesthesia Start: 0807 Anesthesia Stop: 0916    Procedure: PHACOEMULSIFICATION WITH INTRAOCULAR LENS IMPLANT - LEFT EYE (Left: Eye) Diagnosis:       Nuclear sclerotic cataract of left eye      (Nuclear sclerotic cataract of left eye [H25.12])    Surgeons: Geoffrey Hassan MD Responsible Provider: Hilda [place] MD    Anesthesia Type: MAC ASA Status: 3            Anesthesia Type: No value filed.    Oscar Phase I: Oscar Score: 10    Oscar Phase II: Oscar Score: 10    Anesthesia Post Evaluation    Patient location during evaluation: bedside  Patient participation: complete - patient participated  Level of consciousness: awake and alert  Pain score: 0  Airway patency: patent  Nausea & Vomiting: no vomiting  Cardiovascular status: blood pressure returned to baseline  Respiratory status: acceptable  Hydration status: euvolemic  Pain management: adequate    No notable events documented.

## 2024-05-20 NOTE — PROGRESS NOTES
[facility] PRE-OPERATIVE INSTRUCTIONS     Arrival time:0650   Surgery time:0820      Take the following medications with a sip of water:  Follow your MD/Surgeons pre-procedure instructions regarding your medications     Do not eat or drink anything after 12:00 midnight prior to your surgery.  This includes water chewing gum, mints and ice chips.   You may brush your teeth and gargle the morning of your surgery, but do not swallow the water     Please see your family doctor/pediatrician for a history and physical and/or concerning medications.   Bring any test results/reports from your physicians office.   If you are under the care of a heart doctor or specialist doctor, please be aware that you may be asked to them for clearance    You may be asked to stop blood thinners such as Coumadin, Plavix, Fragmin, Lovenox, etc., or any anti-inflammatories such as:  Aspirin, Ibuprofen, Advil, Naproxen prior to your surgery.    We also ask that you stop any OTC medications such as fish oil, vitamin E, glucosamine, garlic, Multivitamins, COQ 10, etc.    We ask that you do not smoke 24 hours prior to surgery  We ask that you do not  drink any alcoholic beverages 24 hours prior to surgery     You must make arrangements for a responsible adult to take you home after your surgery.    For your safety you will not be allowed to leave alone or drive yourself home.  Your surgery will be cancelled if you do not have a ride home.     Also for your safety, it is strongly suggested that someone stay with you the first 24 hours after your surgery.     A parent or legal guardian must accompany a child scheduled for surgery and plan to stay at the hospital until the child is discharged.    Please do not bring other children with you.    For your comfort, please wear simple loose fitting clothing to the hospital.  Please do not bring valuables.    Do not wear any make-up or nail polish on your fingers or toes      For your safety,  * Admitted with diarrhea?                         [] YES    [x]  NO     *Prior history of C-Diff. In last 3 months? [] YES    [x]  NO     *Antibiotic use in the past 6-8 weeks?      [x]  NO    []  YES                 If yes, which ANTIBIOTIC AND REASON______     *Prior hospitalization or nursing home in the last month? []  YES    []  NO        SAFETY FIRST..call before you fall

## 2024-05-23 NOTE — PERIOP NOTE
[address] [facility] [address]  [phone]        Pre-Op Phone Call:     Patient Name: Jose Ragsdale     Telephone Information:   Mobile [phone]     Home phone:  [phone]    Surgery Time:    8:30 AM     Arrival Time:  6:15     Left extended Message:  Yes     Message left with: Left Voicemail      Recent change in health status:  NA     Advised of transportation/ policy:  Yes     NPO policy reviewed:  Yes     Advised to take morning heart/blood pressure medications with sips of water morning of surgery?  Yes     Instructed to bring eye drops, photo identification, and insurance card day of surgery?  Yes     Advised to wear short sleeved button down shirt (no T-shirt underneath):  Yes     Advised not to wear jewelry, hairpins, or pantyhose day of surgery?  Yes     Advised not to wear make-up and to wash face day of surgery?  Yes    Remarks: Left Voicemail         Electronically signed by:  Cyndie Olmos RN at 5/23/2024 8:22 AM

## 2024-05-24 ENCOUNTER — ANESTHESIA EVENT (OUTPATIENT)
Dept: SURGERY | Age: 69
End: 2024-05-24
Payer: MEDICARE

## 2024-05-24 ENCOUNTER — ANESTHESIA (OUTPATIENT)
Dept: SURGERY | Age: 69
End: 2024-05-24
Payer: MEDICARE

## 2024-05-24 ENCOUNTER — HOSPITAL ENCOUNTER (OUTPATIENT)
Age: 69
Setting detail: OUTPATIENT SURGERY
Discharge: HOME | End: 2024-05-24
Attending: OPHTHALMOLOGY | Admitting: OPHTHALMOLOGY
Payer: MEDICARE

## 2024-05-24 VITALS
OXYGEN SATURATION: 100 % | BODY MASS INDEX: 30.78 KG/M2 | TEMPERATURE: 98 F | WEIGHT: 215 LBS | SYSTOLIC BLOOD PRESSURE: 129 MMHG | DIASTOLIC BLOOD PRESSURE: 85 MMHG | HEART RATE: 70 BPM | RESPIRATION RATE: 16 BRPM | HEIGHT: 70 IN

## 2024-05-24 PROBLEM — H25.11 NUCLEAR SCLEROTIC CATARACT OF RIGHT EYE: Status: RESOLVED | Noted: 2024-05-24 | Resolved: 2024-05-24

## 2024-05-24 PROBLEM — H25.11 NUCLEAR SCLEROTIC CATARACT OF RIGHT EYE: Status: ACTIVE | Noted: 2024-05-24

## 2024-05-24 PROCEDURE — 3700000000 HC ANESTHESIA ATTENDED CARE: Performed by: OPHTHALMOLOGY

## 2024-05-24 PROCEDURE — V2632 POST CHMBR INTRAOCULAR LENS: HCPCS | Performed by: OPHTHALMOLOGY

## 2024-05-24 PROCEDURE — 6360000002 HC RX W HCPCS: Performed by: NURSE ANESTHETIST, CERTIFIED REGISTERED

## 2024-05-24 PROCEDURE — 2720000010 HC SURG SUPPLY STERILE: Performed by: OPHTHALMOLOGY

## 2024-05-24 PROCEDURE — 2580000003 HC RX 258: Performed by: NURSE ANESTHETIST, CERTIFIED REGISTERED

## 2024-05-24 PROCEDURE — 2709999900 HC NON-CHARGEABLE SUPPLY: Performed by: OPHTHALMOLOGY

## 2024-05-24 PROCEDURE — 3600000004 HC SURGERY LEVEL 4 BASE: Performed by: OPHTHALMOLOGY

## 2024-05-24 PROCEDURE — 2500000003 HC RX 250 WO HCPCS: Performed by: OPHTHALMOLOGY

## 2024-05-24 PROCEDURE — 2580000003 HC RX 258: Performed by: STUDENT IN AN ORGANIZED HEALTH CARE EDUCATION/TRAINING PROGRAM

## 2024-05-24 PROCEDURE — 7100000010 HC PHASE II RECOVERY - FIRST 15 MIN: Performed by: OPHTHALMOLOGY

## 2024-05-24 PROCEDURE — 3600000014 HC SURGERY LEVEL 4 ADDTL 15MIN: Performed by: OPHTHALMOLOGY

## 2024-05-24 PROCEDURE — 6370000000 HC RX 637 (ALT 250 FOR IP): Performed by: OPHTHALMOLOGY

## 2024-05-24 PROCEDURE — 3700000001 HC ADD 15 MINUTES (ANESTHESIA): Performed by: OPHTHALMOLOGY

## 2024-05-24 PROCEDURE — 6360000002 HC RX W HCPCS: Performed by: OPHTHALMOLOGY

## 2024-05-24 DEVICE — STERILE UV AND BLUE LIGHT FILTERING ACRYLIC FOLDABLE ASPHERIC POSTERIOR CHAMBER INTRAOCULAR LENS
Type: IMPLANTABLE DEVICE | Site: ANTERIOR CHAMBER | Status: FUNCTIONAL
Brand: CLAREON

## 2024-05-24 RX ORDER — KETOROLAC TROMETHAMINE 5 MG/ML
1 SOLUTION OPHTHALMIC SEE ADMIN INSTRUCTIONS
Status: COMPLETED | OUTPATIENT
Start: 2024-05-24 | End: 2024-05-24

## 2024-05-24 RX ORDER — SODIUM CHLORIDE 0.9 % (FLUSH) 0.9 %
5-40 SYRINGE (ML) INJECTION EVERY 12 HOURS SCHEDULED
Status: DISCONTINUED | OUTPATIENT
Start: 2024-05-24 | End: 2024-05-24 | Stop reason: SDUPTHER

## 2024-05-24 RX ORDER — KETOROLAC TROMETHAMINE 5 MG/ML
1 SOLUTION OPHTHALMIC SEE ADMIN INSTRUCTIONS
Status: CANCELLED | OUTPATIENT
Start: 2024-05-24

## 2024-05-24 RX ORDER — SODIUM CHLORIDE 0.9 % (FLUSH) 0.9 %
5-40 SYRINGE (ML) INJECTION EVERY 12 HOURS SCHEDULED
Status: CANCELLED | OUTPATIENT
Start: 2024-05-24

## 2024-05-24 RX ORDER — PHENYLEPHRINE HYDROCHLORIDE 25 MG/ML
1 SOLUTION/ DROPS OPHTHALMIC SEE ADMIN INSTRUCTIONS
Status: COMPLETED | OUTPATIENT
Start: 2024-05-24 | End: 2024-05-24

## 2024-05-24 RX ORDER — KETOROLAC TROMETHAMINE 5 MG/ML
SOLUTION OPHTHALMIC
Status: COMPLETED | OUTPATIENT
Start: 2024-05-24 | End: 2024-05-24

## 2024-05-24 RX ORDER — SODIUM CHLORIDE 9 MG/ML
INJECTION, SOLUTION INTRAVENOUS PRN
Status: CANCELLED | OUTPATIENT
Start: 2024-05-24

## 2024-05-24 RX ORDER — TROPICAMIDE 10 MG/ML
1 SOLUTION/ DROPS OPHTHALMIC SEE ADMIN INSTRUCTIONS
Status: COMPLETED | OUTPATIENT
Start: 2024-05-24 | End: 2024-05-24

## 2024-05-24 RX ORDER — NALOXONE HYDROCHLORIDE 0.4 MG/ML
INJECTION, SOLUTION INTRAMUSCULAR; INTRAVENOUS; SUBCUTANEOUS PRN
Status: CANCELLED | OUTPATIENT
Start: 2024-05-24

## 2024-05-24 RX ORDER — SODIUM CHLORIDE 0.9 % (FLUSH) 0.9 %
5-40 SYRINGE (ML) INJECTION PRN
Status: CANCELLED | OUTPATIENT
Start: 2024-05-24

## 2024-05-24 RX ORDER — TETRACAINE HYDROCHLORIDE 5 MG/ML
SOLUTION OPHTHALMIC
Status: COMPLETED | OUTPATIENT
Start: 2024-05-24 | End: 2024-05-24

## 2024-05-24 RX ORDER — TROPICAMIDE 10 MG/ML
1 SOLUTION/ DROPS OPHTHALMIC SEE ADMIN INSTRUCTIONS
Status: CANCELLED | OUTPATIENT
Start: 2024-05-24

## 2024-05-24 RX ORDER — BRIMONIDINE TARTRATE 1.5 MG/ML
SOLUTION/ DROPS OPHTHALMIC
Status: COMPLETED | OUTPATIENT
Start: 2024-05-24 | End: 2024-05-24

## 2024-05-24 RX ORDER — CIPROFLOXACIN HYDROCHLORIDE 3.5 MG/ML
1 SOLUTION/ DROPS TOPICAL SEE ADMIN INSTRUCTIONS
Status: COMPLETED | OUTPATIENT
Start: 2024-05-24 | End: 2024-05-24

## 2024-05-24 RX ORDER — SODIUM CHLORIDE 0.9 % (FLUSH) 0.9 %
5-40 SYRINGE (ML) INJECTION PRN
Status: DISCONTINUED | OUTPATIENT
Start: 2024-05-24 | End: 2024-05-24 | Stop reason: SDUPTHER

## 2024-05-24 RX ORDER — SODIUM CHLORIDE 0.9 % (FLUSH) 0.9 %
5-40 SYRINGE (ML) INJECTION PRN
Status: DISCONTINUED | OUTPATIENT
Start: 2024-05-24 | End: 2024-05-24 | Stop reason: HOSPADM

## 2024-05-24 RX ORDER — CIPROFLOXACIN HYDROCHLORIDE 3.5 MG/ML
1 SOLUTION/ DROPS TOPICAL SEE ADMIN INSTRUCTIONS
Status: CANCELLED | OUTPATIENT
Start: 2024-05-24

## 2024-05-24 RX ORDER — OFLOXACIN 3 MG/ML
SOLUTION/ DROPS OPHTHALMIC
Status: COMPLETED | OUTPATIENT
Start: 2024-05-24 | End: 2024-05-24

## 2024-05-24 RX ORDER — SODIUM CHLORIDE 9 MG/ML
INJECTION, SOLUTION INTRAVENOUS PRN
Status: DISCONTINUED | OUTPATIENT
Start: 2024-05-24 | End: 2024-05-24 | Stop reason: SDUPTHER

## 2024-05-24 RX ORDER — MIDAZOLAM HYDROCHLORIDE 1 MG/ML
INJECTION INTRAMUSCULAR; INTRAVENOUS PRN
Status: DISCONTINUED | OUTPATIENT
Start: 2024-05-24 | End: 2024-05-24 | Stop reason: SDUPTHER

## 2024-05-24 RX ORDER — SODIUM CHLORIDE 9 MG/ML
INJECTION, SOLUTION INTRAVENOUS CONTINUOUS PRN
Status: DISCONTINUED | OUTPATIENT
Start: 2024-05-24 | End: 2024-05-24 | Stop reason: SDUPTHER

## 2024-05-24 RX ORDER — PREDNISOLONE ACETATE 10 MG/ML
SUSPENSION/ DROPS OPHTHALMIC
Status: COMPLETED | OUTPATIENT
Start: 2024-05-24 | End: 2024-05-24

## 2024-05-24 RX ORDER — SODIUM CHLORIDE 9 MG/ML
INJECTION, SOLUTION INTRAVENOUS PRN
Status: DISCONTINUED | OUTPATIENT
Start: 2024-05-24 | End: 2024-05-24 | Stop reason: HOSPADM

## 2024-05-24 RX ORDER — PHENYLEPHRINE HYDROCHLORIDE 25 MG/ML
1 SOLUTION/ DROPS OPHTHALMIC SEE ADMIN INSTRUCTIONS
Status: CANCELLED | OUTPATIENT
Start: 2024-05-24

## 2024-05-24 RX ORDER — SODIUM CHLORIDE 0.9 % (FLUSH) 0.9 %
5-40 SYRINGE (ML) INJECTION EVERY 12 HOURS SCHEDULED
Status: DISCONTINUED | OUTPATIENT
Start: 2024-05-24 | End: 2024-05-24 | Stop reason: HOSPADM

## 2024-05-24 RX ORDER — FENTANYL CITRATE 50 UG/ML
INJECTION, SOLUTION INTRAMUSCULAR; INTRAVENOUS PRN
Status: DISCONTINUED | OUTPATIENT
Start: 2024-05-24 | End: 2024-05-24 | Stop reason: SDUPTHER

## 2024-05-24 RX ADMIN — CIPROFLOXACIN 1 DROP: 3 SOLUTION OPHTHALMIC at 07:10

## 2024-05-24 RX ADMIN — CIPROFLOXACIN 1 DROP: 3 SOLUTION OPHTHALMIC at 07:00

## 2024-05-24 RX ADMIN — TROPICAMIDE 1 DROP: 10 SOLUTION/ DROPS OPHTHALMIC at 07:10

## 2024-05-24 RX ADMIN — SODIUM CHLORIDE: 9 INJECTION, SOLUTION INTRAVENOUS at 07:04

## 2024-05-24 RX ADMIN — MIDAZOLAM 1 MG: 1 INJECTION INTRAMUSCULAR; INTRAVENOUS at 08:15

## 2024-05-24 RX ADMIN — MIDAZOLAM 1 MG: 1 INJECTION INTRAMUSCULAR; INTRAVENOUS at 08:09

## 2024-05-24 RX ADMIN — CIPROFLOXACIN 1 DROP: 3 SOLUTION OPHTHALMIC at 07:05

## 2024-05-24 RX ADMIN — SODIUM CHLORIDE: 9 INJECTION, SOLUTION INTRAVENOUS at 08:09

## 2024-05-24 RX ADMIN — PHENYLEPHRINE HYDROCHLORIDE 1 DROP: 25 SOLUTION/ DROPS OPHTHALMIC at 07:10

## 2024-05-24 RX ADMIN — KETOROLAC TROMETHAMINE 1 DROP: 0.5 SOLUTION OPHTHALMIC at 07:00

## 2024-05-24 RX ADMIN — TROPICAMIDE 1 DROP: 10 SOLUTION/ DROPS OPHTHALMIC at 07:00

## 2024-05-24 RX ADMIN — FENTANYL CITRATE 50 MCG: 50 INJECTION INTRAMUSCULAR; INTRAVENOUS at 08:09

## 2024-05-24 RX ADMIN — FENTANYL CITRATE 25 MCG: 50 INJECTION INTRAMUSCULAR; INTRAVENOUS at 08:39

## 2024-05-24 RX ADMIN — FENTANYL CITRATE 25 MCG: 50 INJECTION INTRAMUSCULAR; INTRAVENOUS at 08:23

## 2024-05-24 RX ADMIN — PHENYLEPHRINE HYDROCHLORIDE 1 DROP: 25 SOLUTION/ DROPS OPHTHALMIC at 07:05

## 2024-05-24 RX ADMIN — KETOROLAC TROMETHAMINE 1 DROP: 0.5 SOLUTION OPHTHALMIC at 07:10

## 2024-05-24 RX ADMIN — PHENYLEPHRINE HYDROCHLORIDE 1 DROP: 25 SOLUTION/ DROPS OPHTHALMIC at 07:00

## 2024-05-24 RX ADMIN — TROPICAMIDE 1 DROP: 10 SOLUTION/ DROPS OPHTHALMIC at 07:05

## 2024-05-24 RX ADMIN — KETOROLAC TROMETHAMINE 1 DROP: 0.5 SOLUTION OPHTHALMIC at 07:05

## 2024-05-24 ASSESSMENT — PAIN - FUNCTIONAL ASSESSMENT
PAIN_FUNCTIONAL_ASSESSMENT: 0-10
PAIN_FUNCTIONAL_ASSESSMENT: NONE - DENIES PAIN
PAIN_FUNCTIONAL_ASSESSMENT: NONE - DENIES PAIN

## 2024-05-24 NOTE — ANESTHESIA POSTPROCEDURE EVALUATION
Department of Anesthesiology  Postprocedure Note    Patient: Jose Ragsdale  MRN: [id number]  YOB: 1955  Date of evaluation: 5/24/2024    Procedure Summary       Date: 05/24/24 Room / Location: [address]    Anesthesia Start: 0807 Anesthesia Stop: 0854    Procedure: PHACOEMULSIFICATION WITH INTRAOCULAR LENS IMPLANT - RIGHT EYE (Right: Eye) Diagnosis:       Nuclear sclerotic cataract of right eye      (Nuclear sclerotic cataract of right eye [H25.11])    Surgeons: Geoffrey Hassan MD Responsible Provider: Fani Sierra MD    Anesthesia Type: MAC ASA Status: 3            Anesthesia Type: No value filed.    Oscar Phase I: Oscar Score: 10    Oscar Phase II: Oscar Score: 10    Anesthesia Post Evaluation    Patient location during evaluation: PACU  Patient participation: complete - patient participated  Level of consciousness: awake and alert  Airway patency: patent  Nausea & Vomiting: no nausea and no vomiting  Cardiovascular status: hemodynamically stable  Respiratory status: acceptable  Hydration status: stable  Pain management: adequate    No notable events documented.

## 2024-05-24 NOTE — DISCHARGE INSTRUCTIONS
[facility]   [address] [place], [place] [address]   [phone]       Post-Operative Instructions for Day of Cataract Surgery    @ED@    Patient Name: Jose Ragsdale  : 1955  MRN: [id number]    Use the Antibiotic Drop, Ofloxacin,  one drop in the BOTH EYES THREE  more times today then FOUR times daily beginning tomorrow. .  Use the non-steroidal anti-inflammatory drop, Ketoralac  one drop in the BOTH EYES THREE  more times today then FOUR times daily beginning tomorrow.  Use the steroid drop, Durezol, one drop in the BOTH EYES THREE more times today then FOUR times daily beginning tomorrow.  You may watch TV, walk, and do routine chores. Avoid heavy exertion or straining.  Wear shield over operative eye at bedtime for ONE week.  You may take Tylenol for mild irritation or pain. If you have pain worse than what Tylenol can manage, call your physician  Your next appointment is tomorrow  at the  location. Remember to bring your eye medications and eye drop instruction sheet to each appoinment.         General Post-Operative Instructions for Cataract Surgery    COMFORT - Your eye may feel irritated (scratchy, stinging, or achy) this is normal.   DIET - You may resume your regular diet, no alcohol for 24 hours.  ACTIVITY - Do not lift anything over 5 pounds today for the first week. No driving for 24 hours.  EYE CARE - Use your eye drops as instructed. Wash your hands before using your eye drops. Do not bump, rub, or touch the operative eye. No water in or around your eyes, or showering for 24 hours.  SHIELD - Wear the eye shield for 7 nights following surgery.  VISION - You may experience off/on blurry vision today. Your vision will steadily improve over the next days. Call your surgeon if you experience a significant decrease in your vision. Be careful when walking, especially with steps. You may experience double vision until the anesthetic fully wears off.    Bring your eye  drops and instruction sheet with you to each appointment!    Further instructions will be reviewed with you at your follow up appointment.    Any questions, please call the office at [phone].

## 2024-05-24 NOTE — OP NOTE
Operative Note          [facility]  [address]  [address]   [place] [address]  [phone]      5/24/2024    Patient name: Jose Ragsdale  YOB: 1955  MRN: [id number]    Alleriges:   Allergies   Allergen Reactions    Ondansetron Itching    Simvastatin     Statins [Statins] Hives       Pre-operative diagnosis:  Nuclear Sclerotic Cataract and Floppy Iris Right Eye    Post-operative diagnosis:  Same    Procedure Performed:  Phacoemulsification with insertion of a foldable posterior chamber intraocular lens implant and use of iris retractors, right eye.    Anesthesia:  Topical Anesthesia with MAC.     Estimated Blood Loss: None    Specimens removed: None    Limbal Relaxing Incisions:  Axis:N/A    Arc Length: N/A    Toric Axis: N/A    Complications:  None    Description of Procedure: In the same day surgery center, the patient was given the usual pre-operative regimen.  In the operating room suite, the right eye was prepped and draped in the usual sterile fashion.  A paracentesis tract was fashioned, after which 0.5 MI of 1% preservative free Lidocaine was injected into the anterior chamber. Trypan blue dye was instilled into the anterior chamber. The anterior chamber was irrigated with balanced salt solution. Viscoat was instilled into the anterior chamber for a complete chamber fill. A crescent blade was used to develop a clear corneal tunnel a clear cornea temporal tunnel, and the anterior chamber was entered using a keratome. 5 iris retractors were placed. Under optimal magnification and visualization, a continuous curvilinear capsulorrhexis was performed.  Hydro-dissection of the lens was carried out using balanced salt solution. The lens was then phacoemulsified using the divide and conquer technique. Residual cortex was removed using the I/A handpiece followed by thorough posterior capsule polishing. The anterior chamber and capsular bag were then filled with Provisc.

## 2024-05-24 NOTE — ANESTHESIA PRE PROCEDURE
Department of Anesthesiology  Preprocedure Note       Name:  Jose Ragsdale   Age:  68 y.o.  :  1955                                          MRN:  [id number]         Date:  2024      Surgeon: Surgeon(s):  Geoffrey Hassan MD    Procedure: Procedure(s):  PHACOEMULSIFICATION WITH INTRAOCULAR LENS IMPLANT - RIGHT EYE    Medications prior to admission:   Prior to Admission medications    Medication Sig Start Date End Date Taking? Authorizing Provider   tamsulosin (FLOMAX) 0.4 MG capsule Take 1 capsule by mouth daily    Poonam Costello MD REPATHA SURECLICK 140 MG/ML SOAJ INJECT THE CONTENTS OF ONE PEN UNDER THE SKIN EVERY 2 WEEKS 23   Jimmie Urias MD   meloxicam (MOBIC) 15 MG tablet Take 1 tablet by mouth daily 23   Poonam Costello MD   lisinopril (PRINIVIL;ZESTRIL) 10 MG tablet TAKE 1 TABLET EVERY DAY 23   Jimmie Urias MD   amLODIPine (NORVASC) 10 MG tablet TAKE 1 TABLET EVERY DAY 23   Jimmie Urias MD   nitroGLYCERIN (NITROSTAT) 0.4 MG SL tablet ONE TABLET UNDER TONGUE AS NEEDED FOR CHEST PAIN EVERY 5 MINUTES AS DIRECTED  Patient not taking: Reported on 2024 11/3/21   Jimmie Urias MD   fluticasone (FLONASE) 50 MCG/ACT nasal spray 1 spray by Nasal route as needed for Rhinitis  Patient not taking: Reported on 2024    Poonam Costello MD   aspirin 81 MG tablet Take 1 tablet by mouth daily    Poonam Costello MD   Ascorbic Acid (VITAMIN C) 500 MG tablet Take 2 tablets by mouth daily    Poonam Costello MD   multivitamin (ANTIOXIDANT;PROSIGHT) TABS per tablet Take 1 tablet by mouth daily    Poonam Costello MD       Current medications:    Current Facility-Administered Medications   Medication Dose Route Frequency Provider Last Rate Last Admin    sodium chloride flush 0.9 % injection 5-40 mL  5-40 mL IntraVENous 2 times per day Ricardo Paris MD        sodium chloride flush 0.9 % injection 5-40 mL  5-40 mL

## 2024-05-24 NOTE — H&P
Date of Surgery Update:  Jose Ragsdale was seen, history and physical examination reviewed, and patient examined by me today. There have been no significant clinical changes since the completion of the previous history and physical.    Electronically signed by: Geoffrey Hassan MD,5/24/2024,8:09 AM

## 2024-05-30 ENCOUNTER — HOSPITAL ENCOUNTER (OUTPATIENT)
Dept: ULTRASOUND IMAGING | Age: 69
Discharge: HOME | End: 2024-05-30
Attending: UROLOGY
Payer: MEDICARE

## 2024-05-30 DIAGNOSIS — R39.14 FEELING OF INCOMPLETE BLADDER EMPTYING: ICD-10-CM

## 2024-05-30 PROCEDURE — 76770 US EXAM ABDO BACK WALL COMP: CPT

## 2024-07-03 DIAGNOSIS — E78.00 PURE HYPERCHOLESTEROLEMIA: Primary | ICD-10-CM

## 2024-07-12 RX ORDER — EVOLOCUMAB 140 MG/ML
INJECTION, SOLUTION SUBCUTANEOUS
Qty: 6 ML | Refills: 3 | Status: SHIPPED | OUTPATIENT
Start: 2024-07-12

## 2024-07-12 NOTE — TELEPHONE ENCOUNTER
Pts pharmacy called for a status update on refill of Repatha.     Last ov 11/01/2023 mg  Next ov 11/04/2024 mg

## 2024-07-12 NOTE — TELEPHONE ENCOUNTER
Lipid & LFT from June 2023 in care everywhere. Can I order new labs for pt repatha refill? Pended for review.

## 2024-07-24 RX ORDER — LISINOPRIL 10 MG/1
TABLET ORAL
Qty: 90 TABLET | Refills: 1 | Status: SHIPPED | OUTPATIENT
Start: 2024-07-24

## 2024-07-24 RX ORDER — AMLODIPINE BESYLATE 10 MG/1
TABLET ORAL
Qty: 90 TABLET | Refills: 1 | Status: SHIPPED | OUTPATIENT
Start: 2024-07-24

## 2024-07-24 NOTE — TELEPHONE ENCOUNTER
Last ov-11/1/23  [place] [facility] [place]    Bmp-11/14/23 care everywhere

## 2024-10-23 ENCOUNTER — TELEPHONE (OUTPATIENT)
Dept: CARDIOLOGY CLINIC | Age: 69
End: 2024-10-23

## 2024-10-23 NOTE — TELEPHONE ENCOUNTER
Jose Ragsdale, 1955    Cardiac Risk Assessment    What type of procedure are you having?  blepharoplasty    When is your procedure scheduled for?  11/14/24    Medications to be stopped.  Asa, 10 days prior    What physician is performing your procedure?  Dr. Marleni Bryan    Phone Number:   [phone]    Fax number to send the letter:   [phone]    Cardiologist:   [facility] [place]    Last Appointment:   11/1/23 [facility] [place]    Next Appointment:   10/28/24 [facility] [place]

## 2024-10-25 NOTE — PROGRESS NOTES
[facility]   Cardiac Followup    Referring Provider:  Vijay Bailey MD     Chief Complaint   Patient presents with    Follow-up    Hyperlipidemia    Hypertension    Coronary Artery Disease        Jose Ragsdale   1955    History of Present Illness:    Jose Ragsdale is a 68 y.o. male who is here today for follow up for a history of coronary artery disease- IWMI in 2009 with thrombectomy but no stent placement, hypertension, hyperlipidemia. He had a stress test in 6/2019 which was abnormal but did not show any gross ischemia or new blockages.    Today he states he has been feeling well since his last visit. He is tolerating his medications and is taking them as prescribed. He remains active with work and yard work. Patient currently denies any weight gain, edema, palpitations, chest pain, shortness of breath, dizziness, and syncope.he will be having eye surgery soon.          Past Medical History:   has a past medical history of CAD (coronary artery disease), Hyperlipidemia, Hypertension, Kidney stones, MI (myocardial infarction) (HCC), Prolonged emergence from general anesthesia, and Torn  meniscus.    Surgical History:   has a past surgical history that includes Coronary angioplasty with stent (2009); Colonoscopy (2018); Leg Surgery; Knee Arthroplasty (Bilateral); Liver surgery (2020); Colonoscopy (N/A, 04/19/2023); Colonoscopy (N/A, 04/19/2023); Colonoscopy (N/A, 05/09/2023); Bladder surgery (Right, 08/17/2023); Bladder surgery (Right, 08/17/2023); joint replacement; Eye surgery (Left, 5/17/2024); and Eye surgery (Right, 5/24/2024).     Social History:   reports that he has quit smoking. His smoking use included cigars. He has never used smokeless tobacco. He reports current alcohol use. He reports that he does not use drugs.     Family History:  family history includes Heart Disease in his father and mother.     Home Medications:  Prior to Admission medications    Medication Sig Start

## 2024-10-28 ENCOUNTER — OFFICE VISIT (OUTPATIENT)
Dept: CARDIOLOGY CLINIC | Age: 69
End: 2024-10-28
Payer: MEDICARE

## 2024-10-28 VITALS
HEIGHT: 70 IN | WEIGHT: 211 LBS | HEART RATE: 54 BPM | OXYGEN SATURATION: 94 % | DIASTOLIC BLOOD PRESSURE: 68 MMHG | BODY MASS INDEX: 30.21 KG/M2 | SYSTOLIC BLOOD PRESSURE: 120 MMHG

## 2024-10-28 DIAGNOSIS — I10 ESSENTIAL HYPERTENSION, BENIGN: ICD-10-CM

## 2024-10-28 DIAGNOSIS — I25.10 CAD IN NATIVE ARTERY: Primary | ICD-10-CM

## 2024-10-28 DIAGNOSIS — I25.10 ATHEROSCLEROSIS OF NATIVE CORONARY ARTERY OF NATIVE HEART, UNSPECIFIED WHETHER ANGINA PRESENT: ICD-10-CM

## 2024-10-28 DIAGNOSIS — E78.2 MIXED HYPERLIPIDEMIA: ICD-10-CM

## 2024-10-28 PROCEDURE — 3078F DIAST BP <80 MM HG: CPT | Performed by: INTERNAL MEDICINE

## 2024-10-28 PROCEDURE — 1123F ACP DISCUSS/DSCN MKR DOCD: CPT | Performed by: INTERNAL MEDICINE

## 2024-10-28 PROCEDURE — G8417 CALC BMI ABV UP PARAM F/U: HCPCS | Performed by: INTERNAL MEDICINE

## 2024-10-28 PROCEDURE — G8484 FLU IMMUNIZE NO ADMIN: HCPCS | Performed by: INTERNAL MEDICINE

## 2024-10-28 PROCEDURE — G8427 DOCREV CUR MEDS BY ELIG CLIN: HCPCS | Performed by: INTERNAL MEDICINE

## 2024-10-28 PROCEDURE — 3017F COLORECTAL CA SCREEN DOC REV: CPT | Performed by: INTERNAL MEDICINE

## 2024-10-28 PROCEDURE — 93000 ELECTROCARDIOGRAM COMPLETE: CPT | Performed by: INTERNAL MEDICINE

## 2024-10-28 PROCEDURE — 3074F SYST BP LT 130 MM HG: CPT | Performed by: INTERNAL MEDICINE

## 2024-10-28 PROCEDURE — 1036F TOBACCO NON-USER: CPT | Performed by: INTERNAL MEDICINE

## 2024-10-28 PROCEDURE — 1159F MED LIST DOCD IN RCRD: CPT | Performed by: INTERNAL MEDICINE

## 2024-10-28 PROCEDURE — 99214 OFFICE O/P EST MOD 30 MIN: CPT | Performed by: INTERNAL MEDICINE

## 2024-10-28 RX ORDER — LISINOPRIL 10 MG/1
TABLET ORAL
Qty: 90 TABLET | Refills: 3 | Status: SHIPPED | OUTPATIENT
Start: 2024-10-28

## 2024-10-28 RX ORDER — METOPROLOL TARTRATE 25 MG/1
TABLET, FILM COATED ORAL
Qty: 180 TABLET | Refills: 3 | Status: SHIPPED | OUTPATIENT
Start: 2024-10-28

## 2024-10-28 NOTE — PATIENT INSTRUCTIONS
Plan:  ~Ok for eye surgery- you can hold Aspirin for one week, can hold it for longer if surgeon request it      Cardiac medications reviewed including indications and pertinent side effects. Medication list updated at this visit. Stop fish oil. Remain on Repatha.   Check blood pressure and heart rate at home a few times per week- keep a log with dates and times and bring to office visit   Regular exercise and following a healthy diet encouraged   Follow up with me in 1 year.

## 2024-12-02 ENCOUNTER — TELEPHONE (OUTPATIENT)
Dept: CARDIOLOGY CLINIC | Age: 69
End: 2024-12-02

## 2024-12-04 ENCOUNTER — TELEPHONE (OUTPATIENT)
Dept: CARDIOLOGY CLINIC | Age: 69
End: 2024-12-04

## 2024-12-04 NOTE — TELEPHONE ENCOUNTER
Received paper from [facility]. Needing PA for repatha.    Spoke with pt to get lipid done. He v/u.   Will complete next steps when lab is done.

## 2024-12-09 DIAGNOSIS — E78.00 PURE HYPERCHOLESTEROLEMIA: ICD-10-CM

## 2024-12-09 LAB
ALBUMIN SERPL-MCNC: 4.2 G/DL (ref 3.4–5)
ALP SERPL-CCNC: 73 U/L (ref 40–129)
ALT SERPL-CCNC: 16 U/L (ref 10–40)
AST SERPL-CCNC: 19 U/L (ref 15–37)
BILIRUB DIRECT SERPL-MCNC: 0.2 MG/DL (ref 0–0.3)
BILIRUB INDIRECT SERPL-MCNC: 0.2 MG/DL (ref 0–1)
BILIRUB SERPL-MCNC: 0.4 MG/DL (ref 0–1)
CHOLEST SERPL-MCNC: 95 MG/DL (ref 0–199)
HDLC SERPL-MCNC: 47 MG/DL (ref 40–60)
LDL CHOLESTEROL: 34 MG/DL
PROT SERPL-MCNC: 7.1 G/DL (ref 6.4–8.2)
TRIGL SERPL-MCNC: 72 MG/DL (ref 0–150)
VLDLC SERPL CALC-MCNC: 14 MG/DL

## 2025-01-02 RX ORDER — AMLODIPINE BESYLATE 10 MG/1
TABLET ORAL
Qty: 90 TABLET | Refills: 2 | Status: SHIPPED | OUTPATIENT
Start: 2025-01-02

## 2025-01-02 NOTE — TELEPHONE ENCOUNTER
Last Office Visit: 10/28/2024 Provider: CONNER  **Is provider OOT? Yes    Next Office Visit: 11/10/25 Provider: CONNER        Encounter provider correct? Yes If not, change provider  Script changes since last refill? no

## 2025-01-16 ENCOUNTER — TELEPHONE (OUTPATIENT)
Dept: CARDIOLOGY CLINIC | Age: 70
End: 2025-01-16

## 2025-01-16 NOTE — TELEPHONE ENCOUNTER
CARDIAC CLEARANCE REQUEST    What type of procedure are you having:  hemorrhoid  embryectomy  Are you taking any blood thinners:  asprin  Type on anesthesia:  general  When is your procedure scheduled for:  02/03/25  What physician is performing your procedure:  Dr Hernandez  Phone Number:  [phone]  Fax number to send the letter:  [phone]    Last ov 10/28/24 [facility] [place]

## 2025-06-12 RX ORDER — EVOLOCUMAB 140 MG/ML
INJECTION, SOLUTION SUBCUTANEOUS
Qty: 2 ADJUSTABLE DOSE PRE-FILLED PEN SYRINGE | Refills: 6 | Status: SHIPPED | OUTPATIENT
Start: 2025-06-12

## 2025-06-12 NOTE — TELEPHONE ENCOUNTER
Last Office Visit: 10/28/2024 Provider: CONNER  **Is provider OOT? No    Next Office Visit: 11/10/25 Provider:     LAST LABS:   Liver:  Lab Results   Component Value Date    ALT 16 12/09/2024    AST 19 12/09/2024    ALKPHOS 73 12/09/2024    BILITOT 0.4 12/09/2024     Lipid:  Lab Results   Component Value Date    CHOL 177 10/21/2020    TRIG 88 10/21/2020    HDL 47 12/09/2024    LDL 34 12/09/2024    VLDL 14 12/09/2024    CHOLHDLRATIO 3.8 03/18/2016

## 2025-07-22 RX ORDER — AMLODIPINE BESYLATE 10 MG/1
10 TABLET ORAL DAILY
Qty: 90 TABLET | Refills: 1 | Status: SHIPPED | OUTPATIENT
Start: 2025-07-22

## 2025-07-22 NOTE — TELEPHONE ENCOUNTER
Last Office Visit: 10/28/2024 Provider: CONNER  **Is provider OOT? No    Next Office Visit: 11/10/2025 Provider: CONNER    Lab orders needed? no   Encounter provider correct? Yes If not, change provider  Script changes since last refill? no    LAST LABS:       06/23/25 - care everywhere labs

## 2025-08-01 ENCOUNTER — TELEPHONE (OUTPATIENT)
Dept: CARDIOLOGY CLINIC | Age: 70
End: 2025-08-01

## (undated) DEVICE — SYRINGE MED 10ML LUERLOCK TIP W/O SFTY DISP

## (undated) DEVICE — SOLUTION IRRIG 500ML STRL H2O NONPYROGENIC

## (undated) DEVICE — GLOVE SURG SZ 65 CRM LTX FREE POLYISOPRENE POLYMER BEAD ANTI

## (undated) DEVICE — SYRINGE MEDICAL 3ML CLEAR PLASTIC STANDARD NON CONTROL LUERLOCK TIP DISPOSABLE

## (undated) DEVICE — FORCEPS BX 240CM 2.4MM L NDL RAD JAW 4 M00513334

## (undated) DEVICE — GOWN SIRUS NONREIN XL W/TWL: Brand: MEDLINE INDUSTRIES, INC.

## (undated) DEVICE — SURG GL, SENSICARE PI ORTHO LT,LF,PF,7.5: Brand: MEDLINE

## (undated) DEVICE — WIPE INSTR W73XL73CM VISITEC

## (undated) DEVICE — DRAINBAG,ANTI-REFLUX TOWER,L/F,2000ML,LL: Brand: MEDLINE

## (undated) DEVICE — SYRINGE MED 30ML STD CLR PLAS LUERLOCK TIP N CTRL DISP

## (undated) DEVICE — SOLUTION IRRIG 1000ML STRL H2O USP PLAS POUR BTL

## (undated) DEVICE — MICROSURGICAL INSTRUMENT ANTERIOR CHAMBER CANNULA 30GA: Brand: ALCON

## (undated) DEVICE — VISCOAT 0.50ML 27G<USA: Brand: VISCOAT

## (undated) DEVICE — SOLUTION IRRIG BSS ST 500ML

## (undated) DEVICE — SYRINGE MED 10ML SLIP TIP BLNT FILL AND LUERLOCK DISP

## (undated) DEVICE — SEAL ENDOSCP INSTR BX PRT FOR ACC UPTO 3FR

## (undated) DEVICE — SYRINGE TB 1ML NDL 25GA L0.625IN PLAS SLIP TIP CONVENTIONAL

## (undated) DEVICE — Device

## (undated) DEVICE — Device: Brand: MEDEX

## (undated) DEVICE — SUTURE - NEEDLE TYPE AU-5,BLACK MONOFILAMENT NYLON(BMN), SUTURE SIZE 10-0, SUTURE LENGTH 12, DOUBLE ARMED: Brand: A.C.S® (ALCON CLOSURE SYSTEM)

## (undated) DEVICE — SURGICAL PROC PACK SHT WEST V4

## (undated) DEVICE — CATHETER F BLLN 5CC 18FR 2 W HYDRGEL COAT LESS TRAUM LUB

## (undated) DEVICE — GUIDEWIRE URO L150CM DIA0.035IN TAPR 8CM STR TIP STD SHFT

## (undated) DEVICE — SOLUTION IRRIG 3000ML STRL H2O USP UROMATIC PLAS CONT

## (undated) DEVICE — STRIPS OPTHLMC BIOGLO SODIUM 1MG

## (undated) DEVICE — SYSTEM PMP SGL ACT W/ 10CC VAC SYR 1 W VLV FOR ENDOSCP SURG

## (undated) DEVICE — FLEXIBLE IRIS RETRACTORS, 1 UNIT AT 5 HOOKS: Brand: ALCON GRIESHABER

## (undated) DEVICE — CABLE ENDOSCP L10FT ACT DISP

## (undated) DEVICE — SOLUTION IRRIGATION BAL SALT SOLUTION 15 ML STRL BSS

## (undated) DEVICE — MICROSURGICAL INSTRUMENT VISCOAT CANNULA 27GA THREADED HUB: Brand: ALCON

## (undated) DEVICE — OPEN-END FLEXI-TIP URETERAL CATHETER: Brand: FLEXI-TIP

## (undated) DEVICE — GUIDEWIRE ENDOSCP L150CM DIA0.038IN INTRO 14FR TIP 3CM S

## (undated) DEVICE — MERCY HEALTH WEST TURNOVER: Brand: MEDLINE INDUSTRIES, INC.

## (undated) DEVICE — CYSTOSCOPY: Brand: MEDLINE INDUSTRIES, INC.

## (undated) DEVICE — NEEDLE 25GAX5.0MM INJ CARR LOCKE